# Patient Record
Sex: MALE | Race: BLACK OR AFRICAN AMERICAN | Employment: OTHER | URBAN - METROPOLITAN AREA
[De-identification: names, ages, dates, MRNs, and addresses within clinical notes are randomized per-mention and may not be internally consistent; named-entity substitution may affect disease eponyms.]

---

## 2019-09-05 RX ORDER — FINASTERIDE 5 MG/1
5 TABLET, FILM COATED ORAL DAILY
COMMUNITY

## 2019-09-05 RX ORDER — MONTELUKAST SODIUM 10 MG/1
10 TABLET ORAL DAILY
COMMUNITY

## 2019-09-05 RX ORDER — AMLODIPINE BESYLATE 5 MG/1
5 TABLET ORAL DAILY
COMMUNITY

## 2019-09-05 RX ORDER — LOSARTAN POTASSIUM 50 MG/1
50 TABLET ORAL DAILY
COMMUNITY

## 2019-09-05 RX ORDER — ATORVASTATIN CALCIUM 40 MG/1
40 TABLET, FILM COATED ORAL DAILY
COMMUNITY

## 2019-09-05 NOTE — PERIOP NOTES
[address]  Endoscopy Preprocedure Instructions      1. On the day of your surgery, please report to registration located on the 2nd floor of the  [facility]. yes    2. You must have a responsible adult to drive you to the hospital, stay at the hospital during your procedure and drive you home. If they leave your procedure will not be started (no exceptions). yes    3. Do not have anything to eat or drink (including water, gum, mints, coffee, and juice) after midnight. This does not apply to the medications you were instructed to take by your physician. yesIf you are currently taking Plavix, Coumadin, Aspirin, or other blood-thinning agents, contact your physician for special instructions. yes    4. If you are having a procedure that requires bowel prep: We recommend that you have only clear liquids the day before your procedure and begin your bowel prep by 5:00 pm.  You may continue to drink clear liquids until midnight. If for any reason you are not able to complete your prep please notify your physician immediately. yes    5. Have a list of all current medications, including vitamins, herbal supplements and any other over the counter medications. yes    6. If you wear glasses, contacts, dentures and/or hearing aids, they may be removed prior to procedure, please bring a case to store them in. yes    7. You should understand that if you do not follow these instructions your procedure may be cancelled. If your physical condition changes (I.e. fever, cold or flu) please contact your doctor as soon as possible. 8. It is important that you be on time.   If for any reason you are unable to keep your appointment please call )- the day of or your physicians office prior to your scheduled procedure

## 2019-09-12 ENCOUNTER — HOSPITAL ENCOUNTER (OUTPATIENT)
Age: 69
Setting detail: OUTPATIENT SURGERY
Discharge: HOME | End: 2019-09-12
Attending: INTERNAL MEDICINE | Admitting: INTERNAL MEDICINE
Payer: MEDICARE

## 2019-09-12 ENCOUNTER — ANESTHESIA EVENT (OUTPATIENT)
Dept: ENDOSCOPY | Age: 69
End: 2019-09-12
Payer: MEDICARE

## 2019-09-12 ENCOUNTER — ANESTHESIA (OUTPATIENT)
Dept: ENDOSCOPY | Age: 69
End: 2019-09-12
Payer: MEDICARE

## 2019-09-12 VITALS
BODY MASS INDEX: 38.49 KG/M2 | SYSTOLIC BLOOD PRESSURE: 142 MMHG | WEIGHT: 274.91 LBS | OXYGEN SATURATION: 100 % | TEMPERATURE: 97.5 F | DIASTOLIC BLOOD PRESSURE: 79 MMHG | HEART RATE: 69 BPM | HEIGHT: 71 IN | RESPIRATION RATE: 24 BRPM

## 2019-09-12 PROCEDURE — 77030013992 HC SNR POLYP ENDOSC BSC -B: Performed by: INTERNAL MEDICINE

## 2019-09-12 PROCEDURE — 76040000019: Performed by: INTERNAL MEDICINE

## 2019-09-12 PROCEDURE — 74011000250 HC RX REV CODE- 250: Performed by: NURSE ANESTHETIST, CERTIFIED REGISTERED

## 2019-09-12 PROCEDURE — 88305 TISSUE EXAM BY PATHOLOGIST: CPT

## 2019-09-12 PROCEDURE — 74011250636 HC RX REV CODE- 250/636: Performed by: NURSE ANESTHETIST, CERTIFIED REGISTERED

## 2019-09-12 PROCEDURE — 76060000031 HC ANESTHESIA FIRST 0.5 HR: Performed by: INTERNAL MEDICINE

## 2019-09-12 RX ORDER — NALOXONE HYDROCHLORIDE 0.4 MG/ML
0.4 INJECTION, SOLUTION INTRAMUSCULAR; INTRAVENOUS; SUBCUTANEOUS
Status: DISCONTINUED | OUTPATIENT
Start: 2019-09-12 | End: 2019-09-12 | Stop reason: HOSPADM

## 2019-09-12 RX ORDER — MIDAZOLAM HYDROCHLORIDE 1 MG/ML
.25-5 INJECTION, SOLUTION INTRAMUSCULAR; INTRAVENOUS
Status: DISCONTINUED | OUTPATIENT
Start: 2019-09-12 | End: 2019-09-12 | Stop reason: HOSPADM

## 2019-09-12 RX ORDER — PROPOFOL 10 MG/ML
INJECTION, EMULSION INTRAVENOUS AS NEEDED
Status: DISCONTINUED | OUTPATIENT
Start: 2019-09-12 | End: 2019-09-12 | Stop reason: HOSPADM

## 2019-09-12 RX ORDER — EPINEPHRINE 0.1 MG/ML
1 INJECTION INTRACARDIAC; INTRAVENOUS
Status: DISCONTINUED | OUTPATIENT
Start: 2019-09-12 | End: 2019-09-12 | Stop reason: HOSPADM

## 2019-09-12 RX ORDER — LIDOCAINE HYDROCHLORIDE 20 MG/ML
INJECTION, SOLUTION EPIDURAL; INFILTRATION; INTRACAUDAL; PERINEURAL AS NEEDED
Status: DISCONTINUED | OUTPATIENT
Start: 2019-09-12 | End: 2019-09-12 | Stop reason: HOSPADM

## 2019-09-12 RX ORDER — DEXTROMETHORPHAN/PSEUDOEPHED 2.5-7.5/.8
1.2 DROPS ORAL
Status: DISCONTINUED | OUTPATIENT
Start: 2019-09-12 | End: 2019-09-12 | Stop reason: HOSPADM

## 2019-09-12 RX ORDER — PROPOFOL 10 MG/ML
INJECTION, EMULSION INTRAVENOUS
Status: DISCONTINUED | OUTPATIENT
Start: 2019-09-12 | End: 2019-09-12 | Stop reason: HOSPADM

## 2019-09-12 RX ORDER — SODIUM CHLORIDE 9 MG/ML
50 INJECTION, SOLUTION INTRAVENOUS CONTINUOUS
Status: DISCONTINUED | OUTPATIENT
Start: 2019-09-12 | End: 2019-09-12 | Stop reason: HOSPADM

## 2019-09-12 RX ORDER — FLUMAZENIL 0.1 MG/ML
0.2 INJECTION INTRAVENOUS
Status: DISCONTINUED | OUTPATIENT
Start: 2019-09-12 | End: 2019-09-12 | Stop reason: HOSPADM

## 2019-09-12 RX ORDER — ATROPINE SULFATE 0.1 MG/ML
0.4 INJECTION INTRAVENOUS
Status: DISCONTINUED | OUTPATIENT
Start: 2019-09-12 | End: 2019-09-12 | Stop reason: HOSPADM

## 2019-09-12 RX ADMIN — PROPOFOL 20 MG: 10 INJECTION, EMULSION INTRAVENOUS at 09:44

## 2019-09-12 RX ADMIN — PROPOFOL 70 MG: 10 INJECTION, EMULSION INTRAVENOUS at 09:24

## 2019-09-12 RX ADMIN — PROPOFOL 140 MCG/KG/MIN: 10 INJECTION, EMULSION INTRAVENOUS at 09:24

## 2019-09-12 RX ADMIN — LIDOCAINE HYDROCHLORIDE 20 MG: 20 INJECTION, SOLUTION INTRAVENOUS at 09:24

## 2019-09-12 NOTE — ANESTHESIA POSTPROCEDURE EVALUATION
Procedure(s):  COLONOSCOPY  ENDOSCOPIC POLYPECTOMY. MAC    Anesthesia Post Evaluation      Multimodal analgesia: multimodal analgesia not used between 6 hours prior to anesthesia start to PACU discharge  Patient location during evaluation: bedside  Patient participation: complete - patient participated  Level of consciousness: awake  Pain management: adequate  Airway patency: patent  Anesthetic complications: no  Cardiovascular status: acceptable  Respiratory status: acceptable  Hydration status: acceptable  Post anesthesia nausea and vomiting:  controlled      Vitals Value Taken Time   /79 9/12/2019 10:13 AM   Temp 36.4 °C (97.5 °F) 9/12/2019  9:56 AM   Pulse 66 9/12/2019 10:16 AM   Resp 18 9/12/2019 10:16 AM   SpO2 99 % 9/12/2019 10:16 AM   Vitals shown include unvalidated device data.

## 2019-09-12 NOTE — PROCEDURES
Woodrow Slater M.D.  [phone]            2019          Colonoscopy Operative Report  Kathy Raza  :  1950  [facility] Medical Record Number:  [id number]      Indications:    Family history of coloretal cancer (screening only), Personal history of colon polyps (screening only)     :  Federica Arroyo MD    Assistant -- None  Implants -- None    Referring Provider: Jacoby Huggins NP    Sedation:  MAC anesthesia Propofol    Pre-Procedural Exam:      Airway: clear,  No airway problems anticipated  Heart: RRR, without gallops or rubs  Lungs: clear bilaterally without wheezes, crackles, or rhonchi  Abdomen: soft, nontender, nondistended, bowel sounds present  Mental Status: awake, alert and oriented to person, place and time     Procedure Details:  After informed consent was obtained with all risks and benefits of procedure explained and preoperative exam completed, the patient was taken to the endoscopy suite and placed in the left lateral decubitus position. Upon sequential sedation as per above, a digital rectal exam was performed. The Olympus videocolonoscope  was inserted in the rectum and carefully advanced to the cecum, which was identified by the ileocecal valve and appendiceal orifice. The quality of preparation was good. The colonoscope was slowly withdrawn with careful inspection and evaluation between folds. Retroflexion in the rectum was performed. Findings:   Terminal Ileum: not intubated  Cecum: 1  Sessile polyp(s), the largest 5 mm in size;  Ascending Colon: normal  Transverse Colon: 2  Sessile polyp(s), the largest 4 mm in size;   Descending Colon: normal  Sigmoid: normal  Rectum: normal    Interventions:  3 complete polypectomy were performed using cold snare  and the polyps were  retrieved    Specimen Removed:  specimen #1, 5 mm in size, located in the cecum removed by cold snare and retrieved for pathology  #2, 4 mm in size, located in the transverse colon removed by cold biopsy and sent for pathology    Complications: None. EBL:  None. Impression:  3 polyps removed as above    Recommendations:  -Await pathology. -If adenoma is present, repeat colonoscopy in 3 years.   -High fiber diet.    -Resume normal medication(s). Discharge Disposition:  Home in the company of a  when able to ambulate.     Gladys Dee MD  9/12/2019  9:48 AM

## 2019-09-12 NOTE — DISCHARGE INSTRUCTIONS
[address] [facility]. Princess Mendiola M.D.  [phone]          COLON DISCHARGE INSTRUCTIONS       2019    Minda Meyers  :  1950  [facility] Medical Record Number:  [id number]      COLONOSCOPY FINDINGS:  Your colonoscopy showed: 3 polyps removed. DISCOMFORT:  Redness at IV site- apply warm compress to area; if redness or soreness persist- contact your physician  There may be a slight amount of blood passed from the rectum  Gaseous discomfort- walking, belching will help relieve any discomfort  You may not operate a vehicle for 12 hours  You may not engage in an occupation involving machinery or appliances for rest of today  You may not drink alcoholic beverages for at least 12 hours  Avoid making any critical decisions for at least 24 hour  DIET:   High fiber diet. - however -  remember your colon is empty and a heavy meal will produce gas. Avoid these foods:  vegetables, fried / greasy foods, carbonated drinks for today     ACTIVITY:  You may resume your normal daily activities it is recommended that you spend the remainder of the day resting -  avoid any strenuous activity. CALL M.DKelsi ANY SIGN OF:   Increasing pain, nausea, vomiting  Abdominal distension (swelling)  New increased bleeding (oral or rectal)  Fever (chills)  Pain in chest area  Bloody discharge from nose or mouth   Shortness of breath    Follow-up Instructions:   Call Dr. Maritza Mejia if any questions or problems. Telephone # [phone]  Biopsy results will be available in  5 to 7 days  Should have a repeat colonoscopy in 3-5 years.

## 2019-09-12 NOTE — ANESTHESIA PREPROCEDURE EVALUATION
Relevant Problems   No relevant active problems       Anesthetic History   No history of anesthetic complications            Review of Systems / Medical History  Patient summary reviewed, nursing notes reviewed and pertinent labs reviewed    Pulmonary        Sleep apnea: CPAP           Neuro/Psych   Within defined limits           Cardiovascular    Hypertension          Hyperlipidemia    Exercise tolerance: >4 METS     GI/Hepatic/Renal     GERD           Endo/Other             Other Findings   Comments: sarcoid         Physical Exam    Airway  Mallampati: III  TM Distance: 4 - 6 cm  Neck ROM: normal range of motion   Mouth opening: Normal     Cardiovascular    Rhythm: regular  Rate: normal         Dental  No notable dental hx       Pulmonary  Breath sounds clear to auscultation               Abdominal  GI exam deferred       Other Findings            Anesthetic Plan    ASA: 3  Anesthesia type: MAC          Induction: Intravenous  Anesthetic plan and risks discussed with: Patient

## 2019-09-12 NOTE — PERIOP NOTES
4936: Anesthesia staff at patient's bedside administering anesthesia and monitoring patients vital signs throughout procedure. See anesthesia note. 9152: Patient tolerated procedure without problems. Abdomen soft and patient arousable and voices no complaints Report received from CRNA, see anesthesia note. Patient transported to endoscopy recovery area.  Endoscope was pre-cleaned at bedside immediately following procedure by Anna Dennis: Report given to recovery nurse Zoë Landin RN

## 2019-09-12 NOTE — ROUTINE PROCESS
Minda Meyers  1950  [id number]    Situation:  Verbal report received from: Emma Dacosta  Procedure: Procedure(s):  COLONOSCOPY  ENDOSCOPIC POLYPECTOMY    Background:    Preoperative diagnosis: PERSONAL HX OF POLYPS, FAMILY HX OF COLON CANCER  Postoperative diagnosis: colon polyps    :  Dr. Maritza Mejia  Assistant(s): Endoscopy Technician-1: Gretchen Spicer  Endoscopy RN-1: Carl Wood RN    Specimens:   ID Type Source Tests Collected by Time Destination   1 : Michael Chambers MD 9/12/2019 9500 Pathology   2 : polyp Preservative Colon, Izora Siemens  Kecia Kaye MD 9/12/2019 3741 Pathology     H. Pylori  no    Assessment:  Intra-procedure medications     Anesthesia gave intra-procedure sedation and medications, see anesthesia flow sheet yes    Intravenous fluids: NS@ KVO     Vital signs stable     Abdominal assessment: round and soft     Recommendation:  Discharge patient per MD order.   Family or Friend   Permission to share finding with family or friend yes

## 2019-09-12 NOTE — H&P
Wenda Gitelman, M.D.  [phone]            History and Physical       NAME:  Wilfrido Edouard   :   1950   MRN:   [id number]       Referring Physician:  Mahnaz Ledesma NP      Consult Date: 2019 8:22 AM    Chief Complaint:  Colon cancer screening    History of Present Illness:  Patient is a 77 y.o. who is seen for colon cancer screening. Denies any ongoing GI complaints. PMH:  Past Medical History:   Diagnosis Date    Arthritis     GERD (gastroesophageal reflux disease)     Hypercholesteremia     Hyperlipidemia     Hypertension     Liver disease     Prostate irregularity     Sarcoidosis     Seasonal allergic rhinitis     Sleep apnea     cpap:  uses       PSH:  Past Surgical History:   Procedure Laterality Date    HX HERNIA REPAIR  2016    abdominal    HX OTHER SURGICAL      none       Allergies:  No Known Allergies    Home Medications:  Prior to Admission Medications   Prescriptions Last Dose Informant Patient Reported? Taking? amLODIPine (NORVASC) 5 mg tablet 2019 at Unknown time  Yes Yes   Sig: Take 5 mg by mouth daily. Indications: high blood pressure   aspirin 81 mg tablet 2019 at Unknown time  Yes Yes   Sig: Take 81 mg by mouth daily. atorvastatin (LIPITOR) 40 mg tablet 2019 at Unknown time  Yes Yes   Sig: Take 40 mg by mouth daily. Indications: high cholesterol   cyanocobalamin, vitamin B-12, 1,500 mcg TbDi 2019 at Unknown time  Yes Yes   Sig: Take 1 Tab by mouth daily. diphenhydrAMINE (BENADRYL ALLERGY) 25 mg tablet 2019 at Unknown time  Yes Yes   Sig: Take 25 mg by mouth two (2) times daily as needed. finasteride (PROSCAR) 5 mg tablet 2019 at Unknown time  Yes Yes   Sig: Take 5 mg by mouth daily. Indications: enlarged prostate with urination problem   losartan (COZAAR) 50 mg tablet 2019 at Unknown time  Yes Yes   Sig: Take 50 mg by mouth daily.  Indications: high blood pressure   montelukast (SINGULAIR) 10 mg tablet 9/11/2019 at Unknown time  Yes Yes   Sig: Take 10 mg by mouth daily. Indications: inflammation of the nose due to an allergy   omeprazole (PRILOSEC) 40 mg capsule 9/11/2019 at Unknown time  No Yes   Sig: Take 1 Cap by mouth daily. Patient taking differently: Take 40 mg by mouth daily. Indications: gastroesophageal reflux disease      Facility-Administered Medications: None       Hospital Medications:  No current facility-administered medications for this encounter. Social History:  Social History     Tobacco Use    Smoking status: Never Smoker    Smokeless tobacco: Never Used   Substance Use Topics    Alcohol use: No       Family History:  Family History   Problem Relation Age of Onset    Stroke Mother     Stroke Father     Diabetes Other     Hypertension Other     Cancer Sister 43        breast    Cancer Brother 77        prostate    Cancer Brother 66        prostate             Review of Systems:      Constitutional: negative fever, negative chills, negative weight loss  Eyes:   negative visual changes  ENT:   negative sore throat, tongue or lip swelling  Respiratory:  negative cough, negative dyspnea  Cards:  negative for chest pain, palpitations, lower extremity edema  GI:   See HPI  :  negative for frequency, dysuria  Integument:  negative for rash and pruritus  Heme:  negative for easy bruising and gum/nose bleeding  Musculoskel: negative for myalgias,  back pain and muscle weakness  Neuro: negative for headaches, dizziness, vertigo  Psych:  negative for feelings of anxiety, depression       Objective:     Patient Vitals for the past 8 hrs:   BP Temp Pulse Resp SpO2 Height Weight   09/12/19 0815 139/79 97.5 °F (36.4 °C) (!) 59 16 100 % 5' 11\" (1.803 m) 124.7 kg (274 lb 14.6 oz)     No intake/output data recorded. No intake/output data recorded.     EXAM:     NEURO-a&o   HEENT-wnl   LUNGS-clear    COR-regular rate and rhythym     ABD-soft , no tenderness, no rebound, good bs     EXT-no edema     Data Review     No results for input(s): WBC, HGB, HCT, PLT, HGBEXT, HCTEXT, PLTEXT in the last 72 hours. No results for input(s): NA, K, CL, CO2, BUN, CREA, GLU, PHOS, CA in the last 72 hours. No results for input(s): SGOT, GPT, AP, TBIL, TP, ALB, GLOB, GGT, AML, LPSE in the last 72 hours. No lab exists for component: AMYP, HLPSE  No results for input(s): INR, PTP, APTT in the last 72 hours. No lab exists for component: INREXT    Patient Active Problem List   Diagnosis Code    T12 compression fracture ([facility] [address] S22.080A    Back pain, acute M54.9    Essential hypertension, benign I10    Renal insufficiency N28.9    L1 vertebral fracture ([facility] [address] S32.019A    Other and unspecified hyperlipidemia E78.5      Assessment:   · H/o polyps  · Fh of CRC   Plan:   · Colonoscopy today.      Signed By: Brittani Villaseñor MD     9/12/2019  8:22 AM

## 2022-12-14 ENCOUNTER — APPOINTMENT (OUTPATIENT)
Dept: CT IMAGING | Age: 72
End: 2022-12-14
Payer: MEDICARE

## 2022-12-14 ENCOUNTER — HOSPITAL ENCOUNTER (EMERGENCY)
Age: 72
Discharge: HOME | End: 2022-12-14
Attending: EMERGENCY MEDICINE
Payer: MEDICARE

## 2022-12-14 ENCOUNTER — APPOINTMENT (OUTPATIENT)
Dept: GENERAL RADIOLOGY | Age: 72
End: 2022-12-14
Payer: MEDICARE

## 2022-12-14 VITALS
HEART RATE: 90 BPM | RESPIRATION RATE: 20 BRPM | BODY MASS INDEX: 40.8 KG/M2 | HEIGHT: 70 IN | WEIGHT: 285 LBS | DIASTOLIC BLOOD PRESSURE: 110 MMHG | TEMPERATURE: 98.5 F | SYSTOLIC BLOOD PRESSURE: 176 MMHG | OXYGEN SATURATION: 97 %

## 2022-12-14 DIAGNOSIS — S69.92XA HAND INJURY, LEFT, INITIAL ENCOUNTER: ICD-10-CM

## 2022-12-14 DIAGNOSIS — S63.502A WRIST SPRAIN, LEFT, INITIAL ENCOUNTER: ICD-10-CM

## 2022-12-14 DIAGNOSIS — S43.401A SPRAIN OF RIGHT SHOULDER, UNSPECIFIED SHOULDER SPRAIN TYPE, INITIAL ENCOUNTER: ICD-10-CM

## 2022-12-14 DIAGNOSIS — S01.511A LIP LACERATION, INITIAL ENCOUNTER: Primary | ICD-10-CM

## 2022-12-14 PROCEDURE — 90471 IMMUNIZATION ADMIN: CPT

## 2022-12-14 PROCEDURE — 90714 TD VACC NO PRESV 7 YRS+ IM: CPT

## 2022-12-14 PROCEDURE — 99284 EMERGENCY DEPT VISIT MOD MDM: CPT

## 2022-12-14 PROCEDURE — 74011000250 HC RX REV CODE- 250

## 2022-12-14 PROCEDURE — 70450 CT HEAD/BRAIN W/O DYE: CPT

## 2022-12-14 PROCEDURE — 96372 THER/PROPH/DIAG INJ SC/IM: CPT

## 2022-12-14 PROCEDURE — 71101 X-RAY EXAM UNILAT RIBS/CHEST: CPT

## 2022-12-14 PROCEDURE — 75810000293 HC SIMP/SUPERF WND  RPR

## 2022-12-14 PROCEDURE — 74011250636 HC RX REV CODE- 250/636

## 2022-12-14 PROCEDURE — 74011250637 HC RX REV CODE- 250/637

## 2022-12-14 PROCEDURE — 73110 X-RAY EXAM OF WRIST: CPT

## 2022-12-14 PROCEDURE — 73130 X-RAY EXAM OF HAND: CPT

## 2022-12-14 PROCEDURE — 73030 X-RAY EXAM OF SHOULDER: CPT

## 2022-12-14 RX ORDER — CHLORHEXIDINE GLUCONATE 1.2 MG/ML
15 RINSE ORAL 2 TIMES DAILY
Qty: 420 ML | Refills: 0 | Status: SHIPPED | OUTPATIENT
Start: 2022-12-14 | End: 2022-12-28

## 2022-12-14 RX ORDER — LIDOCAINE HYDROCHLORIDE 10 MG/ML
2 INJECTION INFILTRATION; PERINEURAL ONCE
Status: COMPLETED | OUTPATIENT
Start: 2022-12-14 | End: 2022-12-14

## 2022-12-14 RX ORDER — ACETAMINOPHEN 500 MG
1000 TABLET ORAL ONCE
Status: COMPLETED | OUTPATIENT
Start: 2022-12-14 | End: 2022-12-14

## 2022-12-14 RX ORDER — KETOROLAC TROMETHAMINE 15 MG/ML
15 INJECTION, SOLUTION INTRAMUSCULAR; INTRAVENOUS
Status: COMPLETED | OUTPATIENT
Start: 2022-12-14 | End: 2022-12-14

## 2022-12-14 RX ADMIN — LIDOCAINE HYDROCHLORIDE 2 ML: 10 INJECTION, SOLUTION INFILTRATION; PERINEURAL at 15:05

## 2022-12-14 RX ADMIN — ACETAMINOPHEN 1000 MG: 500 TABLET ORAL at 15:05

## 2022-12-14 RX ADMIN — KETOROLAC TROMETHAMINE 15 MG: 15 INJECTION, SOLUTION INTRAMUSCULAR; INTRAVENOUS at 15:05

## 2022-12-14 RX ADMIN — TETANUS AND DIPHTHERIA TOXOIDS ADSORBED 0.5 ML: 2; 2 INJECTION INTRAMUSCULAR at 16:05

## 2022-12-14 NOTE — ED TRIAGE NOTES
PT. TO ED WITH C/O INJURY TO NECK, RIGHT SHOULDER, LEFT RIBCAGE, LOWER BACK AND LEFT HAND FROM A FALL ON YESTERDAY IN PARKING LOT OF DOCTOR'S OFFICE. PT. STATES, TRIPPED OVER PARKING BUMPER.

## 2022-12-14 NOTE — ED PROVIDER NOTES
EMERGENCY DEPARTMENT HISTORY AND PHYSICAL EXAM      Date: 12/14/2022  Patient Name: Fela Albrecht    History of Presenting Illness     Chief Complaint   Patient presents with    Fall       History Provided By: Patient    HPI: Fela Albrecht, 71 y.o. male has a past medical history of hypertension, hyperlipidemia, arthritis, GERD, sarcoidosis, sleep apnea, presents ambulatory into the emergency department accompanied by family member. Complains of left hand/wrist pain and swelling, right shoulder pain, right lower back pain, right knee pain, small hematoma/pain to forehead, lower lip laceration, after experiencing a mechanical fall yesterday morning around 8 AM.  Reports tripping in a parking lot and fell forward onto the concrete sidewalk, landing mostly on his outstretched left hand. He was able to get himself up afterwards and per his family member who saw the fall, he was interacting appropriately and there was no loss of consciousness. He took 2 Tylenol this morning, with some relief. Denies neck pain, cervical spine tenderness, midline back pain, dizziness, chest pain, difficulty breathing, abdominal pain, nausea, vomiting, hematuria. Upon arrival to the ED pt is alert and interacting appropriately; no obvious distress noted. There are no other complaints, changes, or physical findings at this time.     Past History     Past Medical History:  Past Medical History:   Diagnosis Date    Arthritis     GERD (gastroesophageal reflux disease)     Hypercholesteremia     Hyperlipidemia     Hypertension     Liver disease     Prostate irregularity     Sarcoidosis 1999    Seasonal allergic rhinitis     Sleep apnea     cpap:  uses       Past Surgical History:  Past Surgical History:   Procedure Laterality Date    COLONOSCOPY N/A 9/12/2019    COLONOSCOPY performed by Cliff Brown MD at [address] [place] [address]  2016    abdominal    HX OTHER SURGICAL      none       Family History:  Family History Problem Relation Age of Onset    Stroke Mother     Stroke Father     Diabetes Other     Hypertension Other     Cancer Sister 43        breast    Cancer Brother 77        prostate    Cancer Brother 66        prostate       Social History:  Social History     Tobacco Use    Smoking status: Never    Smokeless tobacco: Never   Substance Use Topics    Alcohol use: No    Drug use: No       Allergies:  No Known Allergies    PCP: Maggie Hawthorne NP    No current facility-administered medications on file prior to encounter. Current Outpatient Medications on File Prior to Encounter   Medication Sig Dispense Refill    amLODIPine (NORVASC) 5 mg tablet Take 5 mg by mouth daily. Indications: high blood pressure      losartan (COZAAR) 50 mg tablet Take 50 mg by mouth daily. Indications: high blood pressure      atorvastatin (LIPITOR) 40 mg tablet Take 40 mg by mouth daily. Indications: high cholesterol      montelukast (SINGULAIR) 10 mg tablet Take 10 mg by mouth daily. Indications: inflammation of the nose due to an allergy      finasteride (PROSCAR) 5 mg tablet Take 5 mg by mouth daily. Indications: enlarged prostate with urination problem      cyanocobalamin, vitamin B-12, 1,500 mcg TbDi Take 1 Tab by mouth daily. omeprazole (PRILOSEC) 40 mg capsule Take 1 Cap by mouth daily. (Patient taking differently: Take 40 mg by mouth daily. Indications: gastroesophageal reflux disease) 14 Cap 0    diphenhydrAMINE (BENADRYL ALLERGY) 25 mg tablet Take 25 mg by mouth two (2) times daily as needed. aspirin 81 mg tablet Take 81 mg by mouth daily. (Patient not taking: Reported on 12/14/2022)         Review of Systems   Review of Systems   HENT:  Negative for dental problem (Denies loose teeth). Eyes:  Negative for photophobia and visual disturbance. Respiratory:  Negative for chest tightness and shortness of breath. Cardiovascular:  Negative for chest pain.    Gastrointestinal:  Negative for abdominal distention, abdominal pain, nausea and vomiting. Genitourinary:  Negative for hematuria. Musculoskeletal:  Positive for back pain (Rt lower back pain). Negative for gait problem, neck pain and neck stiffness. Swelling to left hand and wrist.  Pain and decreased range of motion to right shoulder. Left lateral rib pain. Right knee pain. Skin:  Positive for wound (Laceration to inner lower lip. Small superficial abrasion to right patella. ). Neurological:  Negative for dizziness, speech difficulty, weakness, light-headedness, numbness and headaches. Psychiatric/Behavioral:  Negative for confusion. Physical Exam   Physical Exam  Constitutional:       General: He is not in acute distress. Appearance: Normal appearance. He is not ill-appearing, toxic-appearing or diaphoretic. HENT:      Head: Normocephalic. Comments: Mild pain and small hematoma, no more than 1 cm, to mid forehead; no bruising or abrasions. No loose teeth. Mouth/Throat:      Mouth: Mucous membranes are moist.      Pharynx: Oropharynx is clear. Eyes:      Extraocular Movements: Extraocular movements intact. Pupils: Pupils are equal, round, and reactive to light. Cardiovascular:      Rate and Rhythm: Normal rate and regular rhythm. Pulses: Normal pulses. Heart sounds: Normal heart sounds. No murmur heard. No friction rub. No gallop. Pulmonary:      Effort: Pulmonary effort is normal.      Breath sounds: Normal breath sounds. Abdominal:      General: Abdomen is flat. Bowel sounds are normal. There is no distension. Palpations: Abdomen is soft. Tenderness: There is no abdominal tenderness. There is no guarding. Musculoskeletal:      Cervical back: Normal range of motion and neck supple. No rigidity or tenderness. Right lower leg: No edema. Left lower leg: No edema. Comments: Decreased range of motion to right shoulder secondary to pain.   Small abrasion to right patella, no bony deformities, normal range of motion, weightbearing without difficulty. Pain, swelling, ecchymosis to left fourth PIP joint; left fifth MCP, PIP, DIP joints; fourth and fifth metacarpal.  No snuffbox tenderness. Mild swelling to left wrist, full range of motion but patient reports pain with movement; right hand dominant. Left lateral rib pain, no bony deformities, ecchymosis, abrasions. Skin:     General: Skin is warm and dry. Coloration: Skin is not pale. Comments: Approximately 2-3 cm gaping laceration to inner lower lip. Small abrasion, approximately 1-2 cm on Rt knee; clean, dry dressing in place. Neurological:      Mental Status: He is alert and oriented to person, place, and time. Cranial Nerves: No cranial nerve deficit. Sensory: No sensory deficit. Motor: No weakness. Coordination: Coordination normal.      Gait: Gait normal.   Psychiatric:         Mood and Affect: Mood normal.         Behavior: Behavior normal.       Lab and Diagnostic Study Results   Labs -   No results found for this or any previous visit (from the past 12 hour(s)). Radiologic Studies -   @lastxrresult@  CT Results  (Last 48 hours)                 12/14/22 1521  CT HEAD WO CONT Final result    Impression:  No acute abnormality. Narrative:  EXAM: CT HEAD WO CONT       INDICATION: Eval for ICH, fell face first onto concrete       COMPARISON: None. CONTRAST: None. TECHNIQUE: Unenhanced CT of the head was performed using 5 mm images. Brain and   bone windows were generated. Coronal and sagittal reformats. CT dose reduction   was achieved through use of a standardized protocol tailored for this   examination and automatic exposure control for dose modulation. FINDINGS:   The ventricles and sulci are normal in size, shape and configuration. . Mild   small vessel ischemic changes are seen in the periventricular white matter.    There is no intracranial hemorrhage, extra-axial collection, or mass effect. The   basilar cisterns are open. No CT evidence of acute infarct. The bone windows demonstrate no abnormalities. The visualized portions of the   paranasal sinuses and mastoid air cells are clear. CXR Results  (Last 48 hours)      None            Medical Decision Making and ED Course   Differential Diagnosis & Medical Decision Making Provider Note:   Patient presents with lip laceration, r shoulder pain, left hand and wrist pain, left rib pain, right lower back pain. Ddx: Fracture, sprain. Will order x-rays of left hand, left wrist, left ribs, right shoulder. Although his lip laceration occurred yesterday morning, the wound is gaping and there is concern for contamination if not closed; will perform laceration repair patient unsure of last tetanus vaccine; will provide vaccine in the ED. We will also provide analgesics and reassess.      - I am the first provider for this patient. I reviewed the vital signs, available nursing notes, past medical history, past surgical history, family history and social history. The patients presenting problems have been discussed, and they are in agreement with the care plan formulated and outlined with them. I have encouraged them to ask questions as they arise throughout their visit. Vital Signs-Reviewed the patient's vital signs. Patient Vitals for the past 12 hrs:   Temp Pulse Resp BP SpO2   12/14/22 1418 98.5 °F (36.9 °C) 90 20 (!) 176/110 97 %       ED Course:   ED Course as of 12/14/22 1758   Wed Dec 14, 2022   1651 Discussed all test results with the pt and his wife; all questions answered. Will provide Velcro wrist brace for comfort. Pain improvement after pain medication.   [LM]      ED Course User Index  [LM] Dominic Roldan NP         Procedures   Performed by: Regina Benson NP  Procedures  Procedure Note - Laceration Repair:  5:54 PM  Procedure by Regina Benson NP  Complexity: Moderate   3cm v-shaped laceration to lip  was irrigated copiously with NS under jet lavage, prepped with  Copious amounts of sterile saline  and draped in a sterile fashion. The area was anesthetized via local infiltration of 3 mL lidocaine 1% without epinephrine. The wound was explored with the following results: No foreign bodies found. The wound was repaired with Two layer suture closure: Subcutaneous Layer:  2 sutures placed, stitch type:subcutaneous, suture: 5-0 plain gut. Skin Layer:  7 sutures placed, stitch type:simple interrupted, suture: 5-0 plain gut. .  The wound was closed with good hemostasis and approximation. Sterile dressing applied. Vermillion border intact: yes   Estimated blood loss: minimal  The procedure took 16-30 minutes, and pt tolerated well. Disposition   Disposition: DC- Adult Discharges: All of the diagnostic tests were reviewed and questions answered. Diagnosis, care plan and treatment options were discussed. The patient understands the instructions and will follow up as directed. The patients results have been reviewed with them. They have been counseled regarding their diagnosis. The patient and spouse/SO verbally convey understanding and agreement of the signs, symptoms, diagnosis, treatment and prognosis and additionally agrees to follow up as recommended with their PCP in 24 - 48 hours. They also agree with the care-plan and convey that all of their questions have been answered. I have also put together some discharge instructions for them that include: 1) educational information regarding their diagnosis, 2) how to care for their diagnosis at home, as well a 3) list of reasons why they would want to return to the ED prior to their follow-up appointment, should their condition change. DISCHARGE PLAN:  1. Current Discharge Medication List        CONTINUE these medications which have NOT CHANGED    Details   amLODIPine (NORVASC) 5 mg tablet Take 5 mg by mouth daily.  Indications: high blood pressure      losartan (COZAAR) 50 mg tablet Take 50 mg by mouth daily. Indications: high blood pressure      atorvastatin (LIPITOR) 40 mg tablet Take 40 mg by mouth daily. Indications: high cholesterol      montelukast (SINGULAIR) 10 mg tablet Take 10 mg by mouth daily. Indications: inflammation of the nose due to an allergy      finasteride (PROSCAR) 5 mg tablet Take 5 mg by mouth daily. Indications: enlarged prostate with urination problem      cyanocobalamin, vitamin B-12, 1,500 mcg TbDi Take 1 Tab by mouth daily. omeprazole (PRILOSEC) 40 mg capsule Take 1 Cap by mouth daily. Qty: 14 Cap, Refills: 0      diphenhydrAMINE (BENADRYL ALLERGY) 25 mg tablet Take 25 mg by mouth two (2) times daily as needed. aspirin 81 mg tablet Take 81 mg by mouth daily. 2.   Follow-up Information       Follow up With Specialties Details Why Contact Info    Halima Bergeron NP Nurse Practitioner In 3 days Reevaluation after emergency department visit [facility]  [phone]            3. Return to ED if worse   4. Discharge Medication List as of 12/14/2022  5:08 PM        START taking these medications    Details   chlorhexidine (Peridex) 0.12 % solution 15 mL by Swish and Spit route two (2) times a day for 14 days. , Normal, Disp-420 mL, R-0           CONTINUE these medications which have NOT CHANGED    Details   amLODIPine (NORVASC) 5 mg tablet Take 5 mg by mouth daily. Indications: high blood pressure, Historical Med      losartan (COZAAR) 50 mg tablet Take 50 mg by mouth daily. Indications: high blood pressure, Historical Med      atorvastatin (LIPITOR) 40 mg tablet Take 40 mg by mouth daily. Indications: high cholesterol, Historical Med      montelukast (SINGULAIR) 10 mg tablet Take 10 mg by mouth daily. Indications: inflammation of the nose due to an allergy, Historical Med      finasteride (PROSCAR) 5 mg tablet Take 5 mg by mouth daily.  Indications: enlarged prostate with urination problem, Historical Med      cyanocobalamin, vitamin B-12, 1,500 mcg TbDi Take 1 Tab by mouth daily. , Historical Med      omeprazole (PRILOSEC) 40 mg capsule Take 1 Cap by mouth daily. Print, 40 mg, Disp-14 Cap, R-0      diphenhydrAMINE (BENADRYL ALLERGY) 25 mg tablet Take 25 mg by mouth two (2) times daily as needed. Historical Med, 25 mg      aspirin 81 mg tablet Take 81 mg by mouth daily. Historical Med, 81 mg             Diagnosis/Clinical Impression     Clinical Impression:   1. Lip laceration, initial encounter    2. Sprain of right shoulder, unspecified shoulder sprain type, initial encounter    3. Wrist sprain, left, initial encounter    4. Hand injury, left, initial encounter        Attestations: Rey MARCUM NP, am the primary clinician of record. Please note that this dictation was completed with "[employer] the [employer] voice recognition software. Quite often unanticipated grammatical, syntax, homophones, and other interpretive errors are inadvertently transcribed by the computer software. Please disregard these errors. Please excuse any errors that have escaped final proofreading. Thank you. I personally saw and examined the patient. I have reviewed and agree with the MLP's findings, including all diagnostic interpretations, and plans as written. I was present during the key portions of separately billed procedures.       PE:   Constitution: alert, NAD  CV: RRR  PUL: Speaking in full sentences, No WOB  HENT: swolllen lower lip, laceration to inner aspect of lower lip, not thru and thru; 3 cm open wound with irregular edges in somewhat of a V shape with jagged almost gray edges, bleeding controlled  NEURO: AAO x 3, at baseline, no focal findings  ABD: Soft, non-distended, obese    Anthony Ocasio MD

## 2022-12-14 NOTE — DISCHARGE INSTRUCTIONS
Your sutures are dissolvable and should dissolve in about 1-2 weeks. Do not tug on your sutures or try to remove them. You should rinse your mouth (swish and spit) with Peridex twice per day to help keep your sutures and laceration clean. Please return to the emergency department if you develop drainage from your wound, significant redness and/or swelling, fever, or for any other symptoms that are concerning to you. A wrist splint was provided; you can wear this for comfort until your hand and wrist are feeling better. You can continue to take tylenol every 4-6 hours and ibuprofen every 6-8 hours for pain. Thank you! Thank you for allowing me to care for you in the emergency department. It is my goal to provide you with excellent care. If you have not received excellent quality care, please ask to speak to the nurse manager. Please fill out the survey that will come to you by mail or email since we listen to your feedback! Below you will find a list of your tests from today's visit. Should you have any questions, please do not hesitate to call the emergency department. Labs  No results found for this or any previous visit (from the past 12 hour(s)). Radiologic Studies  XR SHOULDER RT AP/LAT MIN 2 V   Final Result   Widening of the RIGHT AC joint which may be related to sprain versus   postsurgical change. XR HAND LT MIN 3 V   Final Result   No acute fracture or dislocation            XR WRIST LT AP/LAT/OBL MIN 3V   Final Result   No fracture. XR RIBS LT W PA CXR MIN 3 V   Final Result   No acute cardiopulmonary findings. No acute, displaced LEFT rib fracture. CT HEAD WO CONT   Final Result   No acute abnormality. CT Results  (Last 48 hours)                 12/14/22 1521  CT HEAD WO CONT Final result    Impression:  No acute abnormality.                Narrative:  EXAM: CT HEAD WO CONT       INDICATION: Eval for ICH, fell face first onto concrete COMPARISON: None. CONTRAST: None. TECHNIQUE: Unenhanced CT of the head was performed using 5 mm images. Brain and   bone windows were generated. Coronal and sagittal reformats. CT dose reduction   was achieved through use of a standardized protocol tailored for this   examination and automatic exposure control for dose modulation. FINDINGS:   The ventricles and sulci are normal in size, shape and configuration. . Mild   small vessel ischemic changes are seen in the periventricular white matter. There is no intracranial hemorrhage, extra-axial collection, or mass effect. The   basilar cisterns are open. No CT evidence of acute infarct. The bone windows demonstrate no abnormalities. The visualized portions of the   paranasal sinuses and mastoid air cells are clear. CXR Results  (Last 48 hours)      None          ------------------------------------------------------------------------------------------------------------  The exam and treatment you received in the Emergency Department were for an urgent problem and are not intended as complete care. It is important that you follow-up with a doctor, nurse practitioner, or physician assistant to:  (1) confirm your diagnosis,  (2) re-evaluation of changes in your illness and treatment, and  (3) for ongoing care. Please take your discharge instructions with you when you go to your follow-up appointment. If you have any problem arranging a follow-up appointment, contact the Emergency Department. If your symptoms become worse or you do not improve as expected and you are unable to reach your health care provider, please return to the Emergency Department. We are available 24 hours a day. If a prescription has been provided, please have it filled as soon as possible to prevent a delay in treatment.  If you have any questions or reservations about taking the medication due to side effects or interactions with other medications, please call your primary care provider or contact the ER.

## 2022-12-19 ENCOUNTER — APPOINTMENT (OUTPATIENT)
Dept: GENERAL RADIOLOGY | Age: 72
End: 2022-12-19
Attending: EMERGENCY MEDICINE
Payer: MEDICARE

## 2022-12-19 ENCOUNTER — HOSPITAL ENCOUNTER (EMERGENCY)
Age: 72
Discharge: HOME | End: 2022-12-19
Attending: EMERGENCY MEDICINE | Admitting: EMERGENCY MEDICINE
Payer: MEDICARE

## 2022-12-19 VITALS
OXYGEN SATURATION: 98 % | DIASTOLIC BLOOD PRESSURE: 91 MMHG | HEIGHT: 70 IN | SYSTOLIC BLOOD PRESSURE: 162 MMHG | BODY MASS INDEX: 40.8 KG/M2 | TEMPERATURE: 98.4 F | WEIGHT: 285 LBS | HEART RATE: 72 BPM | RESPIRATION RATE: 18 BRPM

## 2022-12-19 DIAGNOSIS — T14.8XXA INFECTED WOUND: ICD-10-CM

## 2022-12-19 DIAGNOSIS — H00.011 HORDEOLUM EXTERNUM OF RIGHT UPPER EYELID: Primary | ICD-10-CM

## 2022-12-19 DIAGNOSIS — S22.32XA CLOSED FRACTURE OF ONE RIB OF LEFT SIDE, INITIAL ENCOUNTER: ICD-10-CM

## 2022-12-19 DIAGNOSIS — L08.9 INFECTED WOUND: ICD-10-CM

## 2022-12-19 PROCEDURE — 99283 EMERGENCY DEPT VISIT LOW MDM: CPT

## 2022-12-19 PROCEDURE — 74011250637 HC RX REV CODE- 250/637: Performed by: EMERGENCY MEDICINE

## 2022-12-19 PROCEDURE — 71045 X-RAY EXAM CHEST 1 VIEW: CPT

## 2022-12-19 RX ORDER — LIDOCAINE 4 G/100G
1 PATCH TOPICAL EVERY 24 HOURS
Qty: 10 PATCH | Refills: 0 | Status: SHIPPED | OUTPATIENT
Start: 2022-12-19 | End: 2022-12-29

## 2022-12-19 RX ORDER — CLINDAMYCIN HYDROCHLORIDE 150 MG/1
450 CAPSULE ORAL 3 TIMES DAILY
Qty: 90 CAPSULE | Refills: 0 | Status: SHIPPED | OUTPATIENT
Start: 2022-12-19 | End: 2022-12-29

## 2022-12-19 RX ORDER — OXYCODONE AND ACETAMINOPHEN 5; 325 MG/1; MG/1
1 TABLET ORAL
Status: COMPLETED | OUTPATIENT
Start: 2022-12-19 | End: 2022-12-19

## 2022-12-19 RX ORDER — OXYCODONE AND ACETAMINOPHEN 5; 325 MG/1; MG/1
1 TABLET ORAL
Qty: 12 TABLET | Refills: 0 | Status: SHIPPED | OUTPATIENT
Start: 2022-12-19 | End: 2022-12-22

## 2022-12-19 RX ORDER — CYCLOBENZAPRINE HCL 10 MG
10 TABLET ORAL
Qty: 12 TABLET | Refills: 0 | Status: SHIPPED | OUTPATIENT
Start: 2022-12-19 | End: 2022-12-24

## 2022-12-19 RX ADMIN — OXYCODONE AND ACETAMINOPHEN 1 TABLET: 5; 325 TABLET ORAL at 15:28

## 2022-12-19 NOTE — ED PROVIDER NOTES
EMERGENCY DEPARTMENT HISTORY AND PHYSICAL EXAM      Date: 12/19/2022  Patient Name: Adriane Govea    History of Presenting Illness     Chief Complaint   Patient presents with    Fall    Wrist Pain    Eye Pain     History Provided By: Patient    HPI: Adriane Govea, 71 y.o. male with history of arthritis, GERD, hyperlipidemia, hypertension, and sarcoidosis who presents with left-sided rib pain, lip pain, and right eye pain. States that he fell a week ago. He was seen in the ER for that fall and had basic imaging done a CT of his brain. He was diagnosed with left wrist sprain and had a laceration on his lip that was repaired. States that he has pain to the lip that is constant, moderate, nonradiating. He also has pain to the right eyelid and the right upper eyelid with the area of swelling there. He has had pain to the left rib cage that is constant, moderate, nonradiating, and worse with breathing and coughing. He has had a mild cough. Denies fevers or shortness of breath. Denies any vision changes or feeling of foreign body sensation. No photophobia. There are no other complaints, changes, or physical findings at this time. PCP: Eleanor Kuo NP    Current Outpatient Medications   Medication Sig Dispense Refill    clindamycin (CLEOCIN) 150 mg capsule Take 3 Capsules by mouth three (3) times daily for 10 days. 90 Capsule 0    lidocaine (Lidocaine Pain Relief) 4 % patch 1 Patch by TransDERmal route every twenty-four (24) hours for 10 days. Remove after 12 hours of use; as needed for pain 10 Patch 0    cyclobenzaprine (FLEXERIL) 10 mg tablet Take 1 Tablet by mouth three (3) times daily as needed for Muscle Spasm(s) for up to 5 days. 12 Tablet 0    oxyCODONE-acetaminophen (Percocet) 5-325 mg per tablet Take 1 Tablet by mouth every six (6) hours as needed for Pain for up to 3 days. Max Daily Amount: 4 Tablets.  12 Tablet 0    chlorhexidine (Peridex) 0.12 % solution 15 mL by Swish and Spit route two (2) times a day for 14 days. 420 mL 0    amLODIPine (NORVASC) 5 mg tablet Take 5 mg by mouth daily. Indications: high blood pressure      losartan (COZAAR) 50 mg tablet Take 50 mg by mouth daily. Indications: high blood pressure      atorvastatin (LIPITOR) 40 mg tablet Take 40 mg by mouth daily. Indications: high cholesterol      montelukast (SINGULAIR) 10 mg tablet Take 10 mg by mouth daily. Indications: inflammation of the nose due to an allergy      finasteride (PROSCAR) 5 mg tablet Take 5 mg by mouth daily. Indications: enlarged prostate with urination problem      cyanocobalamin, vitamin B-12, 1,500 mcg TbDi Take 1 Tab by mouth daily. omeprazole (PRILOSEC) 40 mg capsule Take 1 Cap by mouth daily. (Patient taking differently: Take 40 mg by mouth daily. Indications: gastroesophageal reflux disease) 14 Cap 0    diphenhydrAMINE (BENADRYL ALLERGY) 25 mg tablet Take 25 mg by mouth two (2) times daily as needed. aspirin 81 mg tablet Take 81 mg by mouth daily.    (Patient not taking: Reported on 12/14/2022)         Past History   Past Medical History:  Past Medical History:   Diagnosis Date    Arthritis     GERD (gastroesophageal reflux disease)     Hypercholesteremia     Hyperlipidemia     Hypertension     Liver disease     Prostate irregularity     Sarcoidosis 1999    Seasonal allergic rhinitis     Sleep apnea     cpap:  uses        Past Surgical History:  Past Surgical History:   Procedure Laterality Date    COLONOSCOPY N/A 9/12/2019    COLONOSCOPY performed by Peggy Moreno MD at [address] [facility]  2016    abdominal    HX OTHER SURGICAL      none       Family History:  Family History   Problem Relation Age of Onset    Stroke Mother     Stroke Father     Diabetes Other     Hypertension Other     Cancer Sister 43        breast    Cancer Brother 77        prostate    Cancer Brother 66        prostate       Social History:  Social History     Tobacco Use    Smoking status: Never Smokeless tobacco: Never   Substance Use Topics    Alcohol use: No    Drug use: No       Allergies:  No Known Allergies     Review of Systems   Review of Systems   Constitutional:  Negative for fever. HENT:  Negative for congestion. Eyes:  Negative for visual disturbance. Respiratory:  Negative for shortness of breath. Cardiovascular:  Negative for chest pain. Gastrointestinal:  Negative for abdominal pain. Genitourinary:  Negative for dysuria. Musculoskeletal:  Negative for arthralgias. Skin:  Negative for rash. Neurological:  Negative for headaches. Physical Exam   Constitutional: No acute distress. Well-nourished. Skin: No rash. ENT: No rhinorrhea. No cough. Head is normocephalic and atraumatic. The lower lip mucosa has open wound there from the laceration that has some mild dehiscence. There is some white and yellowish discharge that is minimal.  There is also some scar tissue and healing mucosa. Eye: No proptosis or conjunctival injections. Right upper eyelid does have a stye that is small and not erythematous. Extraocular eye movements are intact. No conjunctival erythema. Respiratory: No apparent respiratory distress. Lungs are clear. Gastrointestinal: Nondistended. Nontender abdomen. No bruising to the abdomen. Musculoskeletal: No obvious bony deformities. Tenderness to left anterior rib region around rib 8 and 9. Psychiatric: Cooperative. Appropriate mood and affect. Lab and Diagnostic Study Results   Labs -   No results found for this or any previous visit (from the past 12 hour(s)). Radiologic Studies -   [unfilled]  CT Results  (Last 48 hours)      None          CXR Results  (Last 48 hours)                 12/19/22 1536  XR CHEST SNGL V Final result    Impression:  Clear lungs. Narrative:  PORTABLE CHEST RADIOGRAPH/S: 12/19/2022 3:36 PM       INDICATION: Left rib pain, cough. COMPARISON: None.        TECHNIQUE: Portable frontal semiupright radiograph/s of the chest.       FINDINGS:    The lungs are clear. The central airways are patent. No pneumothorax or pleural   effusion. The descending thoracic aorta is tortuous. Medical Decision Making and ED Course   - I am the first and primary provider for this patient AND AM THE PRIMARY PROVIDER OF RECORD. I reviewed the vital signs, available nursing notes, past medical history, past surgical history, family history and social history. - Initial assessment performed. The patients presenting problems have been discussed, and the staff are in agreement with the care plan formulated and outlined with them. I have encouraged them to ask questions as they arise throughout their visit. Vital Signs-Reviewed the patient's vital signs. Patient Vitals for the past 12 hrs:   Temp Pulse Resp BP SpO2   12/19/22 1332 98.4 °F (36.9 °C) 72 18 (!) 162/91 98 %     MDM  Differential diagnosis: Rib fracture, rib contusion, pneumonia, stye, chalazion, lip infection, wound infection. Appears he likely has a mildly infected wound in the lip where he had laceration repair. I will treat this with clindamycin. The wound is slightly open which I think is a good thing because we will allow some drainage. Patient may also have a rib fracture. It is unclear based on the x-ray read from his most recent visit. I repeated x-ray. It shows no rib fracture however I concern for his rib tenderness. He may have costochondritis, bruising or rib fracture. I will treat with incentive spirometer, lidocaine patch, and Percocet to prevent pneumonia. I did prescribe muscle relaxer including Flexeril. Patient also has a stye. I recommended warm compress. Feel no need for further testing or treatment. Discharged. Disposition     Disposition: Discharged    DISCHARGE PLAN:  1.    Current Discharge Medication List        CONTINUE these medications which have NOT CHANGED    Details   chlorhexidine (Peridex) 0.12 % solution 15 mL by Swish and Spit route two (2) times a day for 14 days. Qty: 420 mL, Refills: 0      amLODIPine (NORVASC) 5 mg tablet Take 5 mg by mouth daily. Indications: high blood pressure      losartan (COZAAR) 50 mg tablet Take 50 mg by mouth daily. Indications: high blood pressure      atorvastatin (LIPITOR) 40 mg tablet Take 40 mg by mouth daily. Indications: high cholesterol      montelukast (SINGULAIR) 10 mg tablet Take 10 mg by mouth daily. Indications: inflammation of the nose due to an allergy      finasteride (PROSCAR) 5 mg tablet Take 5 mg by mouth daily. Indications: enlarged prostate with urination problem      cyanocobalamin, vitamin B-12, 1,500 mcg TbDi Take 1 Tab by mouth daily. omeprazole (PRILOSEC) 40 mg capsule Take 1 Cap by mouth daily. Qty: 14 Cap, Refills: 0      diphenhydrAMINE (BENADRYL ALLERGY) 25 mg tablet Take 25 mg by mouth two (2) times daily as needed. aspirin 81 mg tablet Take 81 mg by mouth daily. 2.   Follow-up Information       Follow up With Specialties Details Why Contact Info    [facility] Emergency Medicine Go today As soon as possible if symptoms worsen, As needed [address]  [phone]    Primary care doctor  Schedule an appointment as soon as possible for a visit in 3 days            3. Return to ED if worse   4. Discharge Medication List as of 12/19/2022  4:03 PM        START taking these medications    Details   clindamycin (CLEOCIN) 150 mg capsule Take 3 Capsules by mouth three (3) times daily for 10 days. , Normal, Disp-90 Capsule, R-0      lidocaine (Lidocaine Pain Relief) 4 % patch 1 Patch by TransDERmal route every twenty-four (24) hours for 10 days. Remove after 12 hours of use; as needed for pain, Normal, Disp-10 Patch, R-0      cyclobenzaprine (FLEXERIL) 10 mg tablet Take 1 Tablet by mouth three (3) times daily as needed for Muscle Spasm(s) for up to 5 days. , Normal, Disp-12 Tablet, R-0      oxyCODONE-acetaminophen (Percocet) 5-325 mg per tablet Take 1 Tablet by mouth every six (6) hours as needed for Pain for up to 3 days. Max Daily Amount: 4 Tablets., Normal, Disp-12 Tablet, R-0           CONTINUE these medications which have NOT CHANGED    Details   chlorhexidine (Peridex) 0.12 % solution 15 mL by Swish and Spit route two (2) times a day for 14 days. , Normal, Disp-420 mL, R-0      amLODIPine (NORVASC) 5 mg tablet Take 5 mg by mouth daily. Indications: high blood pressure, Historical Med      losartan (COZAAR) 50 mg tablet Take 50 mg by mouth daily. Indications: high blood pressure, Historical Med      atorvastatin (LIPITOR) 40 mg tablet Take 40 mg by mouth daily. Indications: high cholesterol, Historical Med      montelukast (SINGULAIR) 10 mg tablet Take 10 mg by mouth daily. Indications: inflammation of the nose due to an allergy, Historical Med      finasteride (PROSCAR) 5 mg tablet Take 5 mg by mouth daily. Indications: enlarged prostate with urination problem, Historical Med      cyanocobalamin, vitamin B-12, 1,500 mcg TbDi Take 1 Tab by mouth daily. , Historical Med      omeprazole (PRILOSEC) 40 mg capsule Take 1 Cap by mouth daily. Print, 40 mg, Disp-14 Cap, R-0      diphenhydrAMINE (BENADRYL ALLERGY) 25 mg tablet Take 25 mg by mouth two (2) times daily as needed. Historical Med, 25 mg      aspirin 81 mg tablet Take 81 mg by mouth daily. Historical Med, 81 mg              Diagnosis/Clinical Impression     Clinical Impression:   1. Hordeolum externum of right upper eyelid    2. Infected wound    3. Closed fracture of one rib of left side, initial encounter           Attestations:  Rianna Tate, DO    Please note that this dictation was completed with [email or web address], the [employer] voice recognition software. Quite often unanticipated grammatical, syntax, homophones, and other interpretive errors are inadvertently transcribed by the computer software.   Please disregard these errors. Please excuse any errors that have escaped final proofreading. Thank you.

## 2022-12-19 NOTE — ED TRIAGE NOTES
Pt was seen here last Thursday for a fall. C/o R eye swelling that was not addressed during the last visit. Also continues to have pain in L side of abdomen and L wrist from the fall. Also had sutures in lip.

## 2023-01-26 ENCOUNTER — HOSPITAL ENCOUNTER (EMERGENCY)
Age: 73
Discharge: HOME | End: 2023-01-26
Attending: EMERGENCY MEDICINE
Payer: MEDICARE

## 2023-01-26 VITALS
RESPIRATION RATE: 17 BRPM | DIASTOLIC BLOOD PRESSURE: 76 MMHG | SYSTOLIC BLOOD PRESSURE: 125 MMHG | HEART RATE: 66 BPM | BODY MASS INDEX: 40.8 KG/M2 | TEMPERATURE: 98.2 F | OXYGEN SATURATION: 97 % | HEIGHT: 70 IN | WEIGHT: 285 LBS

## 2023-01-26 DIAGNOSIS — H00.011 HORDEOLUM OF RIGHT UPPER EYELID, UNSPECIFIED HORDEOLUM TYPE: ICD-10-CM

## 2023-01-26 DIAGNOSIS — L30.9 ECZEMA, UNSPECIFIED TYPE: ICD-10-CM

## 2023-01-26 DIAGNOSIS — R05.1 ACUTE COUGH: Primary | ICD-10-CM

## 2023-01-26 PROCEDURE — 99283 EMERGENCY DEPT VISIT LOW MDM: CPT

## 2023-01-26 RX ORDER — ALBUTEROL SULFATE 90 UG/1
2 AEROSOL, METERED RESPIRATORY (INHALATION)
Qty: 18 G | Refills: 0 | Status: SHIPPED | OUTPATIENT
Start: 2023-01-26

## 2023-01-26 RX ORDER — TRIAMCINOLONE ACETONIDE 1 MG/ML
LOTION TOPICAL 3 TIMES DAILY
Qty: 60 ML | Refills: 0 | Status: SHIPPED | OUTPATIENT
Start: 2023-01-26

## 2023-01-26 RX ORDER — BENZONATATE 100 MG/1
100 CAPSULE ORAL
Qty: 30 CAPSULE | Refills: 0 | Status: SHIPPED | OUTPATIENT
Start: 2023-01-26 | End: 2023-02-05

## 2023-01-26 RX ORDER — PREDNISONE 20 MG/1
40 TABLET ORAL DAILY
Qty: 6 TABLET | Refills: 0 | Status: SHIPPED | OUTPATIENT
Start: 2023-01-26 | End: 2023-01-29

## 2023-01-26 NOTE — ED TRIAGE NOTES
Pt with multiple c/o of varying lengths of time:     1) cough x 1 month, seen by PCP for the same and given meds without relief. 2) lower lip with \"lump\" x 1.5 months, seen here mid December and had stitches since then has not healed properly per pt report. 3) left inner left thigh with eczema dx several years ago but worse x 1 month. 4) right eye with stye that he was seen here for mid December that has not gone away.

## 2023-01-31 NOTE — ED PROVIDER NOTES
[facility] EMERGENCY DEPARTMENT  EMERGENCY DEPARTMENT HISTORY AND PHYSICAL EXAM      Date: 1/26/2023  Patient Name: Urvashi Morgan  MRN: [id number]  [place]: 1950  Date of evaluation: 1/26/2023  Provider: Tiara Barrera NP   Note Started: 8:00 PM 1/30/23    HISTORY OF PRESENT ILLNESS     Chief Complaint   Patient presents with    Cough    Skin Problem    Eye Pain       History Provided By: Patient    HPI: Urvashi Morgan, 71 y.o. male with past medical history of arthritis, GERD, hyperlipidemia, hypercholesterolemia, hypertension, liver disease, sarcoidosis, sleep apnea, presents ambulatory into the emergency department accompanied by his significant other, with multiple unrelated complaints. Patient seen in ER about 1-1/2 months ago for lip laceration which required sutures, now reports a \"hard lump\" where the laceration was repaired; denies drainage, erythema. Complains of congested cough x1 month, was seen by his primary care provider who prescribed promethazine DM cough medication, patient has been using without relief. Also reports negative COVID test at home. Denies fever/chills, chest pain, difficulty breathing, and other than the cough medication, has not tried any other medication or remedies for his symptoms. Has a history of eczema, and has an eczema rash to his left thigh x 3 months. Has been using his steroid ointment without relief (triamcinolone 0.025%). Denies erythema, drainage, warmth to the area, mostly concerned about itching. Expresses concern about a stye on his right eye since mid December. Has been using warm compresses occasionally, without much relief. Denies eye pain, swelling, erythema, eye drainage. Upon arrival to the ED pt is alert and oriented x 3, well-appearing, and interacting appropriately; no obvious distress noted.         PAST MEDICAL HISTORY   Past Medical History:  Past Medical History:   Diagnosis Date    Arthritis     GERD (gastroesophageal reflux disease)     Hypercholesteremia     Hyperlipidemia     Hypertension     Liver disease     Prostate irregularity     Sarcoidosis 1999    Seasonal allergic rhinitis     Sleep apnea     cpap:  uses       Past Surgical History:  Past Surgical History:   Procedure Laterality Date    COLONOSCOPY N/A 9/12/2019    COLONOSCOPY performed by Sierra Anderson MD at [facility]  2016    abdominal    HX OTHER SURGICAL      none       Family History:  Family History   Problem Relation Age of Onset    Stroke Mother     Stroke Father     Diabetes Other     Hypertension Other     Cancer Sister 43        breast    Cancer Brother 77        prostate    Cancer Brother 66        prostate       Social History:  Social History     Tobacco Use    Smoking status: Never    Smokeless tobacco: Never   Substance Use Topics    Alcohol use: No    Drug use: No       Allergies:  No Known Allergies    PCP: Jaydon Cisneros MD    Current Meds:   Discharge Medication List as of 1/26/2023  5:38 PM        CONTINUE these medications which have NOT CHANGED    Details   amLODIPine (NORVASC) 5 mg tablet Take 5 mg by mouth daily. Indications: high blood pressure, Historical Med      losartan (COZAAR) 50 mg tablet Take 50 mg by mouth daily. Indications: high blood pressure, Historical Med      atorvastatin (LIPITOR) 40 mg tablet Take 40 mg by mouth daily. Indications: high cholesterol, Historical Med      montelukast (SINGULAIR) 10 mg tablet Take 10 mg by mouth daily. Indications: inflammation of the nose due to an allergy, Historical Med      finasteride (PROSCAR) 5 mg tablet Take 5 mg by mouth daily. Indications: enlarged prostate with urination problem, Historical Med      cyanocobalamin, vitamin B-12, 1,500 mcg TbDi Take 1 Tab by mouth daily. , Historical Med      omeprazole (PRILOSEC) 40 mg capsule Take 1 Cap by mouth daily. Print, 40 mg, Disp-14 Cap, R-0      diphenhydrAMINE (BENADRYL ALLERGY) 25 mg tablet Take 25 mg by mouth two (2) times daily as needed. Historical Med, 25 mg      aspirin 81 mg tablet Take 81 mg by mouth daily. Historical Med, 81 mg             REVIEW OF SYSTEMS   Review of Systems   Constitutional:  Negative for activity change, appetite change, chills, diaphoresis, fatigue, fever and unexpected weight change. HENT:  Negative for congestion, ear pain, rhinorrhea and sore throat. Eyes:  Negative for pain, discharge, redness, itching and visual disturbance. Stye to right upper lateral lid   Respiratory:  Positive for cough (Mostly dry cough with occasional clear mucus). Negative for chest tightness and shortness of breath. Cardiovascular:  Negative for chest pain and palpitations. Gastrointestinal:  Negative for abdominal pain, diarrhea, nausea and vomiting. Tolerating p.o. without difficulty   Musculoskeletal:  Negative for back pain and myalgias. Skin:  Positive for rash (Eczema rash to left medial thigh). Neurological:  Negative for dizziness, light-headedness and headaches. Positives and Pertinent negatives as per HPI. PHYSICAL EXAM     ED Triage Vitals [01/26/23 1621]   ED Encounter Vitals Group      /76      Pulse (Heart Rate) 66      Resp Rate 17      Temp 98.2 °F (36.8 °C)      Temp src       O2 Sat (%) 97 %      Weight 285 lb      Height 5' 10\"      Physical Exam  Constitutional:       General: He is not in acute distress. Appearance: Normal appearance. He is obese. He is not ill-appearing, toxic-appearing or diaphoretic. HENT:      Head: Normocephalic and atraumatic. Mouth/Throat:      Mouth: Mucous membranes are moist.      Pharynx: Oropharynx is clear. No oropharyngeal exudate or posterior oropharyngeal erythema. Eyes:      General:         Right eye: No discharge. Left eye: No discharge. Extraocular Movements: Extraocular movements intact.       Conjunctiva/sclera: Conjunctivae normal.      Pupils: Pupils are equal, round, and reactive to light.      Comments: Small hordeolum to right upper lateral lid, nonfluctuant, nonerythematous, and no other signs of infection. ~3 mm or less in diameter. Cardiovascular:      Rate and Rhythm: Normal rate and regular rhythm. Pulses: Normal pulses. Heart sounds: Normal heart sounds. Pulmonary:      Effort: Pulmonary effort is normal. No respiratory distress. Breath sounds: Normal breath sounds. No stridor. No wheezing, rhonchi or rales. Abdominal:      General: Abdomen is flat. Bowel sounds are normal. There is no distension. Palpations: Abdomen is soft. Tenderness: There is no abdominal tenderness. Musculoskeletal:      Right lower leg: No edema. Left lower leg: No edema. Skin:     General: Skin is warm and dry. Comments: I scaly rash to left medial thigh, consistent with eczema. No signs of secondary infection. Inner lower lip scar from laceration about 1-1/2 months ago. Appears to be completely healed and without any signs of infection. Upon palpation to the area, small amount of scar tissue noted. No fluctuance or any other signs of abscess requiring drainage. Neurological:      Mental Status: He is alert and oriented to person, place, and time. Psychiatric:         Mood and Affect: Mood normal.         Behavior: Behavior normal.         Thought Content: Thought content normal.         Judgment: Judgment normal.       SCREENINGS               No data recorded        LAB, EKG AND DIAGNOSTIC RESULTS     PROCEDURES   Unless otherwise noted below, none.   Performed by: Tami Saldaña NP   Procedures      CRITICAL CARE TIME       ED COURSE and DIFFERENTIAL DIAGNOSIS/MDM   Vitals:    Vitals:    01/26/23 1621   BP: 125/76   Pulse: 66   Resp: 17   Temp: 98.2 °F (36.8 °C)   SpO2: 97%   Weight: 129.3 kg (285 lb)   Height: 5' 10\" (1.778 m)        Patient was given the following medications:  Medications - No data to display      CONSULTS: (Who and What was discussed)  None     Chronic Conditions: arthritis, GERD, hyperlipidemia, hypercholesterolemia, hypertension, liver disease, sarcoidosis, sleep apnea  Social Determinants affecting Dx or Tx: None     Records Reviewed (source and summary of external notes): Prior medical records and Nursing notes    CC/HPI Summary, DDx, ED Course, and Reassessment. Disposition Considerations (Tests not done, Shared Decision Making, Pt Expectation of Test or Treatment.):     Presents ambulatory into the emergency department accompanied by his significant other, with multiple unrelated complaints. Patient seen in ER about 1-1/2 months ago for lip laceration which required sutures, now reports a \"hard lump\" where the laceration was repaired; denies drainage, erythema. Physical exam reveals scar tissue, but laceration completely healed and without any signs of infection. No fluctuant areas that would suggest abscess/need for drainage. Reassured provided, no other interventions needed at this time. Complains of congested cough x1 month, was seen by his primary care provider who prescribed promethazine DM cough medication, patient has been using without relief. Also reports negative COVID test at home. Denies fever/chills, chest pain, difficulty breathing, and other than the cough medication, has not tried any other medication or remedies for his symptoms. Lung sounds are clear, respirations even and unlabored, no obvious signs of distress. Ddx: Viral URI, acute bronchitis. Less likely pneumonia given physical exam and course of illness, no chest x-ray indicated at this time as it would likely not . Will prescribe Tessalon Perles, albuterol inhaler, and short course of p.o. steroids. Has a history of eczema, and has an eczema rash to his left thigh x 3 months. Has been using his steroid ointment without relief (triamcinolone 0.025%).   Denies erythema, drainage, warmth to the area, mostly concerned about itching. Upon exam, dry scaly rash noted to left thigh, area around this rash appears to be well moisturized. Rash appears to be eczema without signs of secondary infection. Will prescribe a stronger topical steroid. Expresses concern about a stye on his right eye since mid December. Has been using warm compresses occasionally, without much relief. Denies eye pain, swelling, erythema, eye drainage. No signs of infection noted on exam, small stye noted to right upper lateral eyelid. Advised patient to continue with warm compresses. No other interventions indicated at this time. Strict return precautions provided, and advised patient to follow-up with PCP sometime within the next 2 to 3 days. Shared decision making utilized, patient and his significant other are in agreement with plan of care. FINAL IMPRESSION     1. Acute cough    2. Eczema, unspecified type    3. Hordeolum of right upper eyelid, unspecified hordeolum type          DISPOSITION/PLAN   Discharged    Discharge Note: The patient is stable for discharge home. The signs, symptoms, diagnosis, and discharge instructions have been discussed, understanding conveyed, and agreed upon. The patient is to follow up as recommended or return to ER should their symptoms worsen.       PATIENT REFERRED TO:  Follow-up Information       Follow up With Specialties Details Why Contact Info    Your Primary Care Provider  Schedule an appointment as soon as possible for a visit in 1 week Reevaluation, if symptoms do not start to improve over the next 7 days               DISCHARGE MEDICATIONS:  Discharge Medication List as of 1/26/2023  5:38 PM        START taking these medications    Details   albuterol (Ventolin HFA) 90 mcg/actuation inhaler Take 2 Puffs by inhalation every four (4) hours as needed for Wheezing., Normal, Disp-18 g, R-0      benzonatate (Tessalon Perles) 100 mg capsule Take 1 Capsule by mouth three (3) times daily as needed for Cough for up to 10 days. , Normal, Disp-30 Capsule, R-0      triamcinolone (KENALOG) 0.1 % lotion Apply  to affected area three (3) times daily. use thin layer, Normal, Disp-60 mL, R-0      predniSONE (DELTASONE) 20 mg tablet Take 2 Tablets by mouth daily for 3 days. With Breakfast, Normal, Disp-6 Tablet, R-0           CONTINUE these medications which have NOT CHANGED    Details   amLODIPine (NORVASC) 5 mg tablet Take 5 mg by mouth daily. Indications: high blood pressure, Historical Med      losartan (COZAAR) 50 mg tablet Take 50 mg by mouth daily. Indications: high blood pressure, Historical Med      atorvastatin (LIPITOR) 40 mg tablet Take 40 mg by mouth daily. Indications: high cholesterol, Historical Med      montelukast (SINGULAIR) 10 mg tablet Take 10 mg by mouth daily. Indications: inflammation of the nose due to an allergy, Historical Med      finasteride (PROSCAR) 5 mg tablet Take 5 mg by mouth daily. Indications: enlarged prostate with urination problem, Historical Med      cyanocobalamin, vitamin B-12, 1,500 mcg TbDi Take 1 Tab by mouth daily. , Historical Med      omeprazole (PRILOSEC) 40 mg capsule Take 1 Cap by mouth daily. Print, 40 mg, Disp-14 Cap, R-0      diphenhydrAMINE (BENADRYL ALLERGY) 25 mg tablet Take 25 mg by mouth two (2) times daily as needed. Historical Med, 25 mg      aspirin 81 mg tablet Take 81 mg by mouth daily. Historical Med, 81 mg               DISCONTINUED MEDICATIONS:  Discharge Medication List as of 1/26/2023  5:38 PM          I am the Primary Clinician of Record: Carrol Hughes NP (electronically signed)    (Please note that parts of this dictation were completed with voice recognition software. Quite often unanticipated grammatical, syntax, homophones, and other interpretive errors are inadvertently transcribed by the computer software. Please disregards these errors.  Please excuse any errors that have escaped final proofreading.)

## 2023-02-01 ENCOUNTER — APPOINTMENT (OUTPATIENT)
Dept: GENERAL RADIOLOGY | Age: 73
End: 2023-02-01
Attending: FAMILY MEDICINE
Payer: MEDICARE

## 2023-02-01 ENCOUNTER — APPOINTMENT (OUTPATIENT)
Dept: CT IMAGING | Age: 73
End: 2023-02-01
Attending: FAMILY MEDICINE
Payer: MEDICARE

## 2023-02-01 ENCOUNTER — HOSPITAL ENCOUNTER (EMERGENCY)
Age: 73
Discharge: HOME | End: 2023-02-01
Attending: FAMILY MEDICINE
Payer: MEDICARE

## 2023-02-01 VITALS
TEMPERATURE: 98.8 F | BODY MASS INDEX: 40.8 KG/M2 | SYSTOLIC BLOOD PRESSURE: 155 MMHG | HEIGHT: 70 IN | WEIGHT: 285 LBS | OXYGEN SATURATION: 97 % | DIASTOLIC BLOOD PRESSURE: 86 MMHG | HEART RATE: 71 BPM | RESPIRATION RATE: 20 BRPM

## 2023-02-01 DIAGNOSIS — E86.0 DEHYDRATION: ICD-10-CM

## 2023-02-01 DIAGNOSIS — R51.9 NONINTRACTABLE HEADACHE, UNSPECIFIED CHRONICITY PATTERN, UNSPECIFIED HEADACHE TYPE: ICD-10-CM

## 2023-02-01 DIAGNOSIS — R42 LIGHTHEADEDNESS: Primary | ICD-10-CM

## 2023-02-01 LAB
ANION GAP SERPL CALC-SCNC: 7 MMOL/L (ref 5–15)
BASOPHILS # BLD: 0 K/UL (ref 0–0.1)
BASOPHILS NFR BLD: 1 % (ref 0–1)
BUN SERPL-MCNC: 17 MG/DL (ref 6–20)
BUN/CREAT SERPL: 13 (ref 12–20)
CA-I BLD-MCNC: 9.3 MG/DL (ref 8.5–10.1)
CHLORIDE SERPL-SCNC: 102 MMOL/L (ref 97–108)
CO2 SERPL-SCNC: 31 MMOL/L (ref 21–32)
CREAT SERPL-MCNC: 1.28 MG/DL (ref 0.7–1.3)
DIFFERENTIAL METHOD BLD: NORMAL
EOSINOPHIL # BLD: 0.3 K/UL (ref 0–0.4)
EOSINOPHIL NFR BLD: 4 % (ref 0–7)
ERYTHROCYTE [DISTWIDTH] IN BLOOD BY AUTOMATED COUNT: 12.2 % (ref 11.5–14.5)
GLUCOSE SERPL-MCNC: 99 MG/DL (ref 65–100)
HCT VFR BLD AUTO: 44.1 % (ref 36.6–50.3)
HGB BLD-MCNC: 14.8 G/DL (ref 12.1–17)
IMM GRANULOCYTES # BLD AUTO: 0 K/UL (ref 0–0.04)
IMM GRANULOCYTES NFR BLD AUTO: 0 % (ref 0–0.5)
LYMPHOCYTES # BLD: 2.1 K/UL (ref 0.8–3.5)
LYMPHOCYTES NFR BLD: 27 % (ref 12–49)
MCH RBC QN AUTO: 29.5 PG (ref 26–34)
MCHC RBC AUTO-ENTMCNC: 33.6 G/DL (ref 30–36.5)
MCV RBC AUTO: 87.8 FL (ref 80–99)
MONOCYTES # BLD: 0.8 K/UL (ref 0–1)
MONOCYTES NFR BLD: 10 % (ref 5–13)
NEUTS SEG # BLD: 4.6 K/UL (ref 1.8–8)
NEUTS SEG NFR BLD: 58 % (ref 32–75)
PLATELET # BLD AUTO: 267 K/UL (ref 150–400)
PMV BLD AUTO: 9.9 FL (ref 8.9–12.9)
POTASSIUM SERPL-SCNC: 3.6 MMOL/L (ref 3.5–5.1)
RBC # BLD AUTO: 5.02 M/UL (ref 4.1–5.7)
SODIUM SERPL-SCNC: 140 MMOL/L (ref 136–145)
TROPONIN I SERPL HS-MCNC: 10 NG/L (ref 0–76)
WBC # BLD AUTO: 7.9 K/UL (ref 4.1–11.1)

## 2023-02-01 PROCEDURE — 36415 COLL VENOUS BLD VENIPUNCTURE: CPT

## 2023-02-01 PROCEDURE — 93005 ELECTROCARDIOGRAM TRACING: CPT

## 2023-02-01 PROCEDURE — 99284 EMERGENCY DEPT VISIT MOD MDM: CPT

## 2023-02-01 PROCEDURE — 74011250636 HC RX REV CODE- 250/636: Performed by: FAMILY MEDICINE

## 2023-02-01 PROCEDURE — 84484 ASSAY OF TROPONIN QUANT: CPT

## 2023-02-01 PROCEDURE — 71045 X-RAY EXAM CHEST 1 VIEW: CPT

## 2023-02-01 PROCEDURE — 70450 CT HEAD/BRAIN W/O DYE: CPT

## 2023-02-01 PROCEDURE — 80048 BASIC METABOLIC PNL TOTAL CA: CPT

## 2023-02-01 PROCEDURE — 85025 COMPLETE CBC W/AUTO DIFF WBC: CPT

## 2023-02-01 RX ORDER — CLONIDINE HYDROCHLORIDE 0.1 MG/1
0.1 TABLET ORAL
Status: DISCONTINUED | OUTPATIENT
Start: 2023-02-01 | End: 2023-02-01 | Stop reason: HOSPADM

## 2023-02-01 RX ADMIN — SODIUM CHLORIDE 1000 ML: 9 INJECTION, SOLUTION INTRAVENOUS at 18:05

## 2023-02-01 NOTE — ED PROVIDER NOTES
EMERGENCY DEPARTMENT HISTORY AND PHYSICAL EXAM      Date: 2/1/2023  Patient Name: Varghese Cabral    History of Presenting Illness         History Provided By:     HPI: Varghese Cabral, is a very pleasant 71 y.o. male presenting to the ED with a chief complaint of lightheadedness and headache. Patient states he has been feeling slightly lightheaded over the last couple of days. Symptoms are worse after standing from a seated position. He denies any lightheadedness while laying down or sitting. He also developed gradual onset headache over the back of his head. No blurry or double vision. No numbness tingling or weakness in extremities. No neck pain or stiffness. No difficulty with speech. He denies chest pain or shortness of breath. He states he otherwise feels well. The patient denies any other symptoms at this time. PCP: Raymon Schofield MD    No current facility-administered medications on file prior to encounter. Current Outpatient Medications on File Prior to Encounter   Medication Sig Dispense Refill    albuterol (Ventolin HFA) 90 mcg/actuation inhaler Take 2 Puffs by inhalation every four (4) hours as needed for Wheezing. 18 g 0    benzonatate (Tessalon Perles) 100 mg capsule Take 1 Capsule by mouth three (3) times daily as needed for Cough for up to 10 days. 30 Capsule 0    triamcinolone (KENALOG) 0.1 % lotion Apply  to affected area three (3) times daily. use thin layer 60 mL 0    amLODIPine (NORVASC) 5 mg tablet Take 5 mg by mouth daily. Indications: high blood pressure      losartan (COZAAR) 50 mg tablet Take 50 mg by mouth daily. Indications: high blood pressure      atorvastatin (LIPITOR) 40 mg tablet Take 40 mg by mouth daily. Indications: high cholesterol      montelukast (SINGULAIR) 10 mg tablet Take 10 mg by mouth daily. Indications: inflammation of the nose due to an allergy      finasteride (PROSCAR) 5 mg tablet Take 5 mg by mouth daily.  Indications: enlarged prostate with urination problem      cyanocobalamin, vitamin B-12, 1,500 mcg TbDi Take 1 Tab by mouth daily. omeprazole (PRILOSEC) 40 mg capsule Take 1 Cap by mouth daily. (Patient taking differently: Take 40 mg by mouth daily. Indications: gastroesophageal reflux disease) 14 Cap 0    diphenhydrAMINE (BENADRYL ALLERGY) 25 mg tablet Take 25 mg by mouth two (2) times daily as needed. aspirin 81 mg tablet Take 81 mg by mouth daily. (Patient not taking: Reported on 12/14/2022)         Past History     Past Medical History:  Past Medical History:   Diagnosis Date    Arthritis     GERD (gastroesophageal reflux disease)     Hypercholesteremia     Hyperlipidemia     Hypertension     Liver disease     Prostate irregularity     Sarcoidosis 1999    Seasonal allergic rhinitis     Sleep apnea     cpap:  uses       Past Surgical History:  Past Surgical History:   Procedure Laterality Date    COLONOSCOPY N/A 9/12/2019    COLONOSCOPY performed by Deborah Gaines MD at [address] [place] [address]  2016    abdominal    HX OTHER SURGICAL      none       Family History:  Family History   Problem Relation Age of Onset    Stroke Mother     Stroke Father     Diabetes Other     Hypertension Other     Cancer Sister 43        breast    Cancer Brother 77        prostate    Cancer Brother 66        prostate       Social History:  Social History     Tobacco Use    Smoking status: Never    Smokeless tobacco: Never   Substance Use Topics    Alcohol use: No    Drug use: No       Allergies:  No Known Allergies      Review of Systems     Review of Systems   Constitutional:  Negative for activity change, appetite change, chills, fatigue and fever. HENT:  Negative for congestion and sore throat. Eyes:  Negative for photophobia and visual disturbance. Respiratory:  Negative for cough, shortness of breath and wheezing. Cardiovascular:  Negative for chest pain, palpitations and leg swelling.    Gastrointestinal:  Negative for abdominal pain, diarrhea, nausea and vomiting. Endocrine: Negative for cold intolerance and heat intolerance. Musculoskeletal:  Negative for gait problem and joint swelling. Skin:  Negative for color change and rash. Neurological:  Positive for light-headedness and headaches. Negative for dizziness, tremors, seizures, syncope, facial asymmetry, speech difficulty, weakness and numbness. Physical Exam     Physical Exam  Constitutional:       General: He is not in acute distress. Appearance: Normal appearance. He is not toxic-appearing. HENT:      Head: Normocephalic and atraumatic. Right Ear: External ear normal.      Left Ear: External ear normal.      Mouth/Throat:      Mouth: Mucous membranes are dry. Pharynx: Oropharynx is clear. Eyes:      Extraocular Movements: Extraocular movements intact. Conjunctiva/sclera: Conjunctivae normal.   Cardiovascular:      Rate and Rhythm: Normal rate and regular rhythm. Pulses: Normal pulses. Heart sounds: Normal heart sounds. Pulmonary:      Effort: Pulmonary effort is normal. No respiratory distress. Breath sounds: Normal breath sounds. No wheezing, rhonchi or rales. Abdominal:      General: There is no distension. Palpations: Abdomen is soft. Tenderness: There is no abdominal tenderness. There is no guarding or rebound. Musculoskeletal:         General: No deformity. Cervical back: Normal range of motion and neck supple. Right lower leg: No edema. Left lower leg: No edema. Skin:     Capillary Refill: Capillary refill takes 2 to 3 seconds. Findings: No erythema or rash. Neurological:      General: No focal deficit present. Mental Status: He is alert and oriented to person, place, and time.       Gait: Gait normal.      Comments: No focal neurologic deficits, alert and oriented x4, cranial nerves II through XII grossly intact, no sensory deficits, no weakness in extremities, no pronator drift, no abnormal coordination, no abnormal gait, no abnormal deep tendon reflexes. No motor nor sensory deficits. No nystagmus. Psychiatric:         Mood and Affect: Mood normal.         Behavior: Behavior normal.       Lab and Diagnostic Study Results     Labs -     Recent Results (from the past 12 hour(s))   CBC WITH AUTOMATED DIFF    Collection Time: 02/01/23  5:25 PM   Result Value Ref Range    WBC 7.9 4.1 - 11.1 K/uL    RBC 5.02 4.10 - 5.70 M/uL    HGB 14.8 12.1 - 17.0 g/dL    HCT 44.1 36.6 - 50.3 %    MCV 87.8 80.0 - 99.0 FL    MCH 29.5 26.0 - 34.0 PG    MCHC 33.6 30.0 - 36.5 g/dL    RDW 12.2 11.5 - 14.5 %    PLATELET 122 425 - 278 K/uL    MPV 9.9 8.9 - 12.9 FL    NEUTROPHILS 58 32 - 75 %    LYMPHOCYTES 27 12 - 49 %    MONOCYTES 10 5 - 13 %    EOSINOPHILS 4 0 - 7 %    BASOPHILS 1 0 - 1 %    IMMATURE GRANULOCYTES 0 0.0 - 0.5 %    ABS. NEUTROPHILS 4.6 1.8 - 8.0 K/UL    ABS. LYMPHOCYTES 2.1 0.8 - 3.5 K/UL    ABS. MONOCYTES 0.8 0.0 - 1.0 K/UL    ABS. EOSINOPHILS 0.3 0.0 - 0.4 K/UL    ABS. BASOPHILS 0.0 0.0 - 0.1 K/UL    ABS. IMM. GRANS. 0.0 0.00 - 0.04 K/UL    DF AUTOMATED     METABOLIC PANEL, BASIC    Collection Time: 02/01/23  5:25 PM   Result Value Ref Range    Sodium 140 136 - 145 mmol/L    Potassium 3.6 3.5 - 5.1 mmol/L    Chloride 102 97 - 108 mmol/L    CO2 31 21 - 32 mmol/L    Anion gap 7 5 - 15 mmol/L    Glucose 99 65 - 100 mg/dL    BUN 17 6 - 20 mg/dL    Creatinine 1.28 0.70 - 1.30 mg/dL    BUN/Creatinine ratio 13 12 - 20      eGFR 60 (L) >60 ml/min/1.73m2    Calcium 9.3 8.5 - 10.1 mg/dL   TROPONIN-HIGH SENSITIVITY    Collection Time: 02/01/23  5:25 PM   Result Value Ref Range    Troponin-High Sensitivity 10 0 - 76 ng/L       Radiologic Studies -   @[email or web address]@  CT Results  (Last 48 hours)                 02/01/23 1656  CT HEAD WO CONT Final result    Impression:          No acute abnormality       Narrative:  EXAM: CT HEAD WO CONT       INDICATION: dizzy       COMPARISON: 12/14/2022. CONTRAST: None. TECHNIQUE: Unenhanced CT of the head was performed using 5 mm images. Brain and   bone windows were generated. Coronal and sagittal reformats. CT dose reduction   was achieved through use of a standardized protocol tailored for this   examination and automatic exposure control for dose modulation. FINDINGS:   The ventricles and sulci are normal in size, shape and configuration. There is   no significant white matter disease. There is no intracranial hemorrhage,   extra-axial collection, or mass effect. The basilar cisterns are open. No CT   evidence of acute infarct. The bone windows demonstrate no abnormalities. The visualized portions of the   paranasal sinuses and mastoid air cells are clear. CXR Results  (Last 48 hours)                 02/01/23 1702  XR CHEST PORT Final result    Impression:  1. No acute disease           Narrative:  INDICATION:  dizzy        Exam: Portable chest 1702. Comparison: 12/19/2022. Findings: Cardiomediastinal silhouette is within normal limits. Pulmonary   vasculature is not engorged. There are no focal parenchymal opacities,   effusions, or pneumothorax. Medical Decision Making   - I am the first provider for this patient. - I reviewed the vital signs, available nursing notes, past medical history, past surgical history, family history and social history. - Initial assessment performed. The patients presenting problems have been discussed, and they are in agreement with the care plan formulated and outlined with them. I have encouraged them to ask questions as they arise throughout their visit. Vital Signs-Reviewed the patient's vital signs.   Patient Vitals for the past 12 hrs:   Temp Pulse Resp BP SpO2   02/01/23 1807 -- 71 20 (!) 155/86 97 %   02/01/23 1711 -- 64 18 129/81 97 %   02/01/23 1647 -- 66 20 (!) 155/85 97 %   02/01/23 1637 98.8 °F (37.1 °C) 67 20 (!) 167/102 96 % ED Course/ Provider Notes (Medical Decision Making):     Patient presented to the emergency department with the aforementioned chief complaint. On examination the patient is nontoxic. Vitals were reviewed per above. Patient GCS 15, no focal deficits on exam.  NIHSS score 0. He is dry appearing on exam.  EKG was interpreted by myself EKG demonstrates normal sinus rhythm, heart rate 63, QTc 448, right bundle branch block, no STEMI. Patient states he has known right bundle branch block. High-sensitivity troponin within normal range after more than 4 hours of symptoms making ACS unlikely. Chest x-ray negative. CT head negative making intracranial bleed/mass unlikely. Patient with resolution of lightheadedness after fluids. States he feels back to normal.  Headache improved. Suspect his symptoms related to dehydration. Discussed supportive measures for symptoms. Deangelo Stager was given a thorough list of signs and symptoms that would warrant an immediate return to the emergency department. Otherwise Deangelo Stager will follow up with PCP. Procedures   Medical Decision Makingedical Decision Making  Performed by: Angella Watts DO  Procedures  None       Disposition   Disposition:     Home     All of the diagnostic tests were reviewed and questions answered. Diagnosis, care plan and treatment options were discussed. The patient understands the instructions and will follow up as directed. The patients results have been reviewed with them. They have been counseled regarding their diagnosis. The patient verbally convey understanding and agreement of the signs, symptoms, diagnosis, treatment and prognosis and additionally agrees to follow up as recommended with their PCP in 24 - 48 hours. They also agree with the care-plan and convey that all of their questions have been answered.   I have also put together some discharge instructions for them that include: 1) educational information regarding their diagnosis, 2) how to care for their diagnosis at home, as well a 3) list of reasons why they would want to return to the ED prior to their follow-up appointment, should their condition change. DISCHARGE PLAN:    1. Current Discharge Medication List        CONTINUE these medications which have NOT CHANGED    Details   albuterol (Ventolin HFA) 90 mcg/actuation inhaler Take 2 Puffs by inhalation every four (4) hours as needed for Wheezing. Qty: 18 g, Refills: 0      benzonatate (Tessalon Perles) 100 mg capsule Take 1 Capsule by mouth three (3) times daily as needed for Cough for up to 10 days. Qty: 30 Capsule, Refills: 0      triamcinolone (KENALOG) 0.1 % lotion Apply  to affected area three (3) times daily. use thin layer  Qty: 60 mL, Refills: 0      amLODIPine (NORVASC) 5 mg tablet Take 5 mg by mouth daily. Indications: high blood pressure      losartan (COZAAR) 50 mg tablet Take 50 mg by mouth daily. Indications: high blood pressure      atorvastatin (LIPITOR) 40 mg tablet Take 40 mg by mouth daily. Indications: high cholesterol      montelukast (SINGULAIR) 10 mg tablet Take 10 mg by mouth daily. Indications: inflammation of the nose due to an allergy      finasteride (PROSCAR) 5 mg tablet Take 5 mg by mouth daily. Indications: enlarged prostate with urination problem      cyanocobalamin, vitamin B-12, 1,500 mcg TbDi Take 1 Tab by mouth daily. omeprazole (PRILOSEC) 40 mg capsule Take 1 Cap by mouth daily. Qty: 14 Cap, Refills: 0      diphenhydrAMINE (BENADRYL ALLERGY) 25 mg tablet Take 25 mg by mouth two (2) times daily as needed. aspirin 81 mg tablet Take 81 mg by mouth daily. 2.   Follow-up Information       Follow up With Specialties Details Why Contact Info    Your primary care doctor  Schedule an appointment as soon as possible for a visit in 1 day                3.  Return to ED if worse       4.    Current Discharge Medication List            Diagnosis Clinical Impression:    1. Lightheadedness    2. Nonintractable headache, unspecified chronicity pattern, unspecified headache type    3. Dehydration        Attestations:    Jayme Castro, DO    Please note that this dictation was completed with [employer], the computer voice recognition software. Quite often unanticipated grammatical, syntax, homophones, and other interpretive errors are inadvertently transcribed by the computer software. Please disregard these errors. Please excuse any errors that have escaped final proofreading. Thank you.

## 2023-02-01 NOTE — DISCHARGE INSTRUCTIONS
Thank you! Thank you for allowing me to care for you in the emergency department. It is my goal to provide you with excellent care. If you have not received excellent quality care, please ask to speak to the nurse manager. Please fill out the survey that will come to you by mail or email since we listen to your feedback! Below you will find a list of your tests from today's visit. Should you have any questions, please do not hesitate to call the emergency department. Labs  Recent Results (from the past 12 hour(s))   CBC WITH AUTOMATED DIFF    Collection Time: 02/01/23  5:25 PM   Result Value Ref Range    WBC 7.9 4.1 - 11.1 K/uL    RBC 5.02 4.10 - 5.70 M/uL    HGB 14.8 12.1 - 17.0 g/dL    HCT 44.1 36.6 - 50.3 %    MCV 87.8 80.0 - 99.0 FL    MCH 29.5 26.0 - 34.0 PG    MCHC 33.6 30.0 - 36.5 g/dL    RDW 12.2 11.5 - 14.5 %    PLATELET 157 523 - 636 K/uL    MPV 9.9 8.9 - 12.9 FL    NEUTROPHILS 58 32 - 75 %    LYMPHOCYTES 27 12 - 49 %    MONOCYTES 10 5 - 13 %    EOSINOPHILS 4 0 - 7 %    BASOPHILS 1 0 - 1 %    IMMATURE GRANULOCYTES 0 0.0 - 0.5 %    ABS. NEUTROPHILS 4.6 1.8 - 8.0 K/UL    ABS. LYMPHOCYTES 2.1 0.8 - 3.5 K/UL    ABS. MONOCYTES 0.8 0.0 - 1.0 K/UL    ABS. EOSINOPHILS 0.3 0.0 - 0.4 K/UL    ABS. BASOPHILS 0.0 0.0 - 0.1 K/UL    ABS. IMM. GRANS. 0.0 0.00 - 0.04 K/UL    DF AUTOMATED     METABOLIC PANEL, BASIC    Collection Time: 02/01/23  5:25 PM   Result Value Ref Range    Sodium 140 136 - 145 mmol/L    Potassium 3.6 3.5 - 5.1 mmol/L    Chloride 102 97 - 108 mmol/L    CO2 31 21 - 32 mmol/L    Anion gap 7 5 - 15 mmol/L    Glucose 99 65 - 100 mg/dL    BUN 17 6 - 20 mg/dL    Creatinine 1.28 0.70 - 1.30 mg/dL    BUN/Creatinine ratio 13 12 - 20      eGFR 60 (L) >60 ml/min/1.73m2    Calcium 9.3 8.5 - 10.1 mg/dL   TROPONIN-HIGH SENSITIVITY    Collection Time: 02/01/23  5:25 PM   Result Value Ref Range    Troponin-High Sensitivity 10 0 - 76 ng/L       Radiologic Studies  XR CHEST PORT   Final Result   1.  No acute disease         CT HEAD WO CONT   Final Result         No acute abnormality        CT Results  (Last 48 hours)                 02/01/23 1656  CT HEAD WO CONT Final result    Impression:          No acute abnormality       Narrative:  EXAM: CT HEAD WO CONT       INDICATION: dizzy       COMPARISON: 12/14/2022. CONTRAST: None. TECHNIQUE: Unenhanced CT of the head was performed using 5 mm images. Brain and   bone windows were generated. Coronal and sagittal reformats. CT dose reduction   was achieved through use of a standardized protocol tailored for this   examination and automatic exposure control for dose modulation. FINDINGS:   The ventricles and sulci are normal in size, shape and configuration. There is   no significant white matter disease. There is no intracranial hemorrhage,   extra-axial collection, or mass effect. The basilar cisterns are open. No CT   evidence of acute infarct. The bone windows demonstrate no abnormalities. The visualized portions of the   paranasal sinuses and mastoid air cells are clear. CXR Results  (Last 48 hours)                 02/01/23 1702  XR CHEST PORT Final result    Impression:  1. No acute disease           Narrative:  INDICATION:  dizzy        Exam: Portable chest 1702. Comparison: 12/19/2022. Findings: Cardiomediastinal silhouette is within normal limits. Pulmonary   vasculature is not engorged. There are no focal parenchymal opacities,   effusions, or pneumothorax.                 ------------------------------------------------------------------------------------------------------------  The exam and treatment you received in the Emergency Department were for an urgent problem and are not intended as complete care.  It is important that you follow-up with a doctor, nurse practitioner, or physician assistant to:  (1) confirm your diagnosis,  (2) re-evaluation of changes in your illness and treatment, and  (3) for ongoing care. Please take your discharge instructions with you when you go to your follow-up appointment. If you have any problem arranging a follow-up appointment, contact the Emergency Department. If your symptoms become worse or you do not improve as expected and you are unable to reach your health care provider, please return to the Emergency Department. We are available 24 hours a day. If a prescription has been provided, please have it filled as soon as possible to prevent a delay in treatment. If you have any questions or reservations about taking the medication due to side effects or interactions with other medications, please call your primary care provider or contact the ER.

## 2023-02-01 NOTE — Clinical Note
[facility] [facility] EMERGENCY DEPARTMENT  [address] [facility] [address]  [phone]    Work/School Note    Date: 2/1/2023    To Whom It May concern:    Pito Cristina was seen and treated today in the emergency room by the following provider(s):  Attending Provider: Jhoan Alvarado DO. Pito Cristina is excused from work/school on 02/01/23 and 02/02/23. He is medically clear to return to work/school on 2/3/2023.        Sincerely,          Vicente Sena DO

## 2023-02-01 NOTE — Clinical Note
[address] EMERGENCY DEPARTMENT  [address] [facility] [address]  [phone]    Work/School Note    Date: 2/1/2023    To Whom It May concern:    Deangelo Paris was seen and treated today in the emergency room by the following provider(s):  Attending Provider: Amey Lesch, DO. Deangelo Paris is excused from work/school on 02/01/23 and 02/02/23. He is medically clear to return to work/school on 2/3/2023.        Sincerely,          Darryle Hahn

## 2023-02-03 LAB
ATRIAL RATE: 63 BPM
CALCULATED P AXIS, ECG09: 13 DEGREES
CALCULATED R AXIS, ECG10: -42 DEGREES
CALCULATED T AXIS, ECG11: -23 DEGREES
DIAGNOSIS, 93000: NORMAL
P-R INTERVAL, ECG05: 194 MS
Q-T INTERVAL, ECG07: 438 MS
QRS DURATION, ECG06: 136 MS
QTC CALCULATION (BEZET), ECG08: 448 MS
VENTRICULAR RATE, ECG03: 63 BPM

## (undated) DEVICE — NDL FLTR TIP 5 MIC 18GX1.5IN --

## (undated) DEVICE — NDL PRT INJ NSAF BLNT 18GX1.5 --

## (undated) DEVICE — BASIN EMSIS 16OZ GRAPHITE PLAS KID SHP MOLD GRAD FOR ORAL

## (undated) DEVICE — SYR 3ML LL TIP 1/10ML GRAD --

## (undated) DEVICE — BAG BELONG PT PERS CLEAR HANDL

## (undated) DEVICE — ADULT SPO2 SENSOR: Brand: NELLCOR

## (undated) DEVICE — POLYP TRAP: Brand: TRAPEASE®

## (undated) DEVICE — BAG SPEC BIOHZRD 10 X 10 IN --

## (undated) DEVICE — KIT COLON W/ 1.1OZ LUB AND 2 END

## (undated) DEVICE — 1200 GUARD II KIT W/5MM TUBE W/O VAC TUBE: Brand: GUARDIAN

## (undated) DEVICE — CONTAINER SPEC 20 ML LID NEUT BUFF FORMALIN 10 % POLYPR STS

## (undated) DEVICE — CUFF RMFG BP INF SZ 11 DISP -- LAWSON OEM ITEM 238915

## (undated) DEVICE — SOLIDIFIER MEDC 1200ML -- CONVERT TO 356117

## (undated) DEVICE — Device

## (undated) DEVICE — SET ADMIN 16ML TBNG L100IN 2 Y INJ SITE IV PIGGY BK DISP

## (undated) DEVICE — SYR 5ML 1/5 GRAD LL NSAF LF --

## (undated) DEVICE — SNARE ENDOSCP M L240CM W27MM SHTH DIA2.4MM CHN 2.8MM OVL

## (undated) DEVICE — KENDALL RADIOLUCENT FOAM MONITORING ELECTRODE -RECTANGULAR SHAPE: Brand: KENDALL

## (undated) DEVICE — CATH IV AUTOGRD BC BLU 22GA 25 -- INSYTE